# Patient Record
Sex: FEMALE | Race: OTHER | HISPANIC OR LATINO | URBAN - METROPOLITAN AREA
[De-identification: names, ages, dates, MRNs, and addresses within clinical notes are randomized per-mention and may not be internally consistent; named-entity substitution may affect disease eponyms.]

---

## 2018-04-22 ENCOUNTER — EMERGENCY (EMERGENCY)
Facility: HOSPITAL | Age: 23
LOS: 1 days | Discharge: HOME | End: 2018-04-22
Attending: EMERGENCY MEDICINE
Payer: COMMERCIAL

## 2018-04-22 VITALS
TEMPERATURE: 98 F | SYSTOLIC BLOOD PRESSURE: 110 MMHG | DIASTOLIC BLOOD PRESSURE: 60 MMHG | RESPIRATION RATE: 16 BRPM | OXYGEN SATURATION: 100 % | HEART RATE: 73 BPM

## 2018-04-22 VITALS
TEMPERATURE: 99 F | OXYGEN SATURATION: 100 % | HEART RATE: 78 BPM | DIASTOLIC BLOOD PRESSURE: 68 MMHG | SYSTOLIC BLOOD PRESSURE: 110 MMHG | RESPIRATION RATE: 20 BRPM

## 2018-04-22 LAB
ALBUMIN SERPL ELPH-MCNC: 4.2 G/DL — SIGNIFICANT CHANGE UP (ref 3.3–5)
ALP SERPL-CCNC: 56 U/L — SIGNIFICANT CHANGE UP (ref 40–120)
ALT FLD-CCNC: 13 U/L — SIGNIFICANT CHANGE UP (ref 10–45)
ANION GAP SERPL CALC-SCNC: 14 MMOL/L — SIGNIFICANT CHANGE UP (ref 5–17)
AST SERPL-CCNC: 13 U/L — SIGNIFICANT CHANGE UP (ref 10–40)
BASOPHILS # BLD AUTO: 0 K/UL — SIGNIFICANT CHANGE UP (ref 0–0.2)
BASOPHILS NFR BLD AUTO: 0.6 % — SIGNIFICANT CHANGE UP (ref 0–2)
BILIRUB SERPL-MCNC: 0.2 MG/DL — SIGNIFICANT CHANGE UP (ref 0.2–1.2)
BUN SERPL-MCNC: 9 MG/DL — SIGNIFICANT CHANGE UP (ref 7–23)
CALCIUM SERPL-MCNC: 9.5 MG/DL — SIGNIFICANT CHANGE UP (ref 8.4–10.5)
CHLORIDE SERPL-SCNC: 103 MMOL/L — SIGNIFICANT CHANGE UP (ref 96–108)
CO2 SERPL-SCNC: 26 MMOL/L — SIGNIFICANT CHANGE UP (ref 22–31)
CREAT SERPL-MCNC: 0.69 MG/DL — SIGNIFICANT CHANGE UP (ref 0.5–1.3)
EOSINOPHIL # BLD AUTO: 0 K/UL — SIGNIFICANT CHANGE UP (ref 0–0.5)
EOSINOPHIL NFR BLD AUTO: 0.5 % — SIGNIFICANT CHANGE UP (ref 0–6)
GLUCOSE SERPL-MCNC: 93 MG/DL — SIGNIFICANT CHANGE UP (ref 70–99)
HCT VFR BLD CALC: 34.4 % — LOW (ref 34.5–45)
HGB BLD-MCNC: 11.8 G/DL — SIGNIFICANT CHANGE UP (ref 11.5–15.5)
LYMPHOCYTES # BLD AUTO: 3.3 K/UL — SIGNIFICANT CHANGE UP (ref 1–3.3)
LYMPHOCYTES # BLD AUTO: 41.8 % — SIGNIFICANT CHANGE UP (ref 13–44)
MCHC RBC-ENTMCNC: 31.5 PG — SIGNIFICANT CHANGE UP (ref 27–34)
MCHC RBC-ENTMCNC: 34.2 GM/DL — SIGNIFICANT CHANGE UP (ref 32–36)
MCV RBC AUTO: 92.2 FL — SIGNIFICANT CHANGE UP (ref 80–100)
MONOCYTES # BLD AUTO: 0.7 K/UL — SIGNIFICANT CHANGE UP (ref 0–0.9)
MONOCYTES NFR BLD AUTO: 8.3 % — SIGNIFICANT CHANGE UP (ref 2–14)
NEUTROPHILS # BLD AUTO: 3.8 K/UL — SIGNIFICANT CHANGE UP (ref 1.8–7.4)
NEUTROPHILS NFR BLD AUTO: 48.8 % — SIGNIFICANT CHANGE UP (ref 43–77)
PLATELET # BLD AUTO: 250 K/UL — SIGNIFICANT CHANGE UP (ref 150–400)
POTASSIUM SERPL-MCNC: 4.2 MMOL/L — SIGNIFICANT CHANGE UP (ref 3.5–5.3)
POTASSIUM SERPL-SCNC: 4.2 MMOL/L — SIGNIFICANT CHANGE UP (ref 3.5–5.3)
PROT SERPL-MCNC: 7.2 G/DL — SIGNIFICANT CHANGE UP (ref 6–8.3)
RBC # BLD: 3.73 M/UL — LOW (ref 3.8–5.2)
RBC # FLD: 12.2 % — SIGNIFICANT CHANGE UP (ref 10.3–14.5)
SODIUM SERPL-SCNC: 143 MMOL/L — SIGNIFICANT CHANGE UP (ref 135–145)
WBC # BLD: 7.8 K/UL — SIGNIFICANT CHANGE UP (ref 3.8–10.5)
WBC # FLD AUTO: 7.8 K/UL — SIGNIFICANT CHANGE UP (ref 3.8–10.5)

## 2018-04-22 PROCEDURE — 99284 EMERGENCY DEPT VISIT MOD MDM: CPT | Mod: 25

## 2018-04-22 PROCEDURE — 99283 EMERGENCY DEPT VISIT LOW MDM: CPT

## 2018-04-22 PROCEDURE — 80053 COMPREHEN METABOLIC PANEL: CPT

## 2018-04-22 PROCEDURE — 85027 COMPLETE CBC AUTOMATED: CPT

## 2018-04-22 PROCEDURE — 82272 OCCULT BLD FECES 1-3 TESTS: CPT

## 2018-04-22 RX ORDER — LIDOCAINE 4 G/100G
5 CREAM TOPICAL ONCE
Qty: 0 | Refills: 0 | Status: COMPLETED | OUTPATIENT
Start: 2018-04-22 | End: 2018-04-22

## 2018-04-22 RX ORDER — LIDOCAINE 4 G/100G
1 CREAM TOPICAL
Qty: 1 | Refills: 0 | OUTPATIENT
Start: 2018-04-22

## 2018-04-22 RX ORDER — HYDROCORTISONE 1 %
1 OINTMENT (GRAM) TOPICAL
Qty: 1 | Refills: 0 | OUTPATIENT
Start: 2018-04-22

## 2018-04-22 RX ADMIN — LIDOCAINE 5 MILLILITER(S): 4 CREAM TOPICAL at 05:38

## 2018-04-22 NOTE — ED ADULT NURSE REASSESSMENT NOTE - NS ED NURSE REASSESS COMMENT FT1
Pt AAOx4, VSS, NAD, resting comfortably in bed. Pt verbalized understanding to return to ED for increased pain, increased rectal bleeding, or any feeling of lightheadedness. pt ambulated independently out of ED.

## 2018-04-22 NOTE — ED ADULT NURSE NOTE - OBJECTIVE STATEMENT
21 y/o female presents to the ED ambulatory with steady gait. A&Ox4. C/O rectal bleed for months. Pt. states was evaluated by gastroenterologist, performed colonoscopy, negative results. Pt. states normal formed bowel, last BM this morning. Pt. states blood present when she wipes, clots present. +easy work of breathing, clear lung sounds. abdomen soft, nontender, and nondistended. full range of motion of all extremities. no active bleeding present and no bumps present.

## 2018-04-22 NOTE — ED PROVIDER NOTE - MEDICAL DECISION MAKING DETAILS
Typical story for fissure, hemorrhoid but none seen on exam, guaiac negative. plan: pain control, cbc for anemia

## 2018-04-22 NOTE — ED ADULT NURSE REASSESSMENT NOTE - NS ED NURSE REASSESS COMMENT FT1
Pt. is resting in the room, easy work of breathing. VSS. Awaiting lab results. Safety maintained. Parents at the bedside. Will continue to monitor.

## 2018-04-22 NOTE — ED PROVIDER NOTE - PHYSICAL EXAMINATION
Gen: NAD, AOx3  Head: NCAT  HEENT: PERRL, oral mucosa moist, normal conjunctiva  Lung: CTAB, no respiratory distress  CV: rrr, no murmurs, Normal perfusion  Abd: soft, NTND, no CVA tenderness  Rectal: no external mass/hemorrhoid/tenderness/fissure, guaiac negative  MSK: No edema, no visible deformities  Neuro: No focal neurologic deficits  Skin: No rash   Psych: normal affect

## 2018-04-22 NOTE — ED PROVIDER NOTE - OBJECTIVE STATEMENT
Patient is 22 y F with PMH anxiety presenting with rectal pain with bleeding. Has been several week long episodes of having severe pain with defecation, bright red blood on toilet tissue with brown stool, for months, worse tonight, spoke with on call GI who recommended going to ED. Fam hx of IBS.  Is having GI workup, had colonoscopy/endoscopy last week wnl, capsule endoscopy pending. No weight loss. LMP: regular, 3/27    PMD: Darling Mathur  GI: Kai Krishnamurthy  ROS: Denies fever, palpitations, chills, recent sickness, HA, vision changes, cough, SOB, chest pain, n/v/d/c, dysuria, hematuria, rash, new joint aches, sick contacts, and recent travel. Patient is 22 y F with PMH anxiety presenting with rectal pain with bleeding. Has been several week long episodes of having severe pain with defecation, bright red blood on toilet tissue with brown stool, for months, worse tonight, spoke with on call GI who recommended going to ED. Fam hx of IBS.  Is having GI workup, had colonoscopy/endoscopy last week wnl, capsule endoscopy pending. No weight loss. LMP: regular, 3/27    PMD: Darling Mathur  GI: Kai Krishnamurthy  ROS: Denies fever, palpitations, chills, recent sickness, HA, vision changes, cough, SOB, chest pain, n/v/d/c, dysuria, hematuria, rash, new joint aches, sick contacts, and recent travel.    Attendinyo female presents with rectal pain for 2 months with bowel movements.  pain when she wipes.  sometimes has blood on stool.  pt seeing a GI doc for this.  Had a colonoscopy and endoscopy.  no fever.

## 2018-07-23 ENCOUNTER — APPOINTMENT (OUTPATIENT)
Dept: PULMONOLOGY | Facility: CLINIC | Age: 23
End: 2018-07-23

## 2018-07-27 ENCOUNTER — INPATIENT (INPATIENT)
Facility: HOSPITAL | Age: 23
LOS: 4 days | Discharge: HOME | End: 2018-08-01
Attending: PSYCHIATRY & NEUROLOGY | Admitting: PSYCHIATRY & NEUROLOGY
Payer: COMMERCIAL

## 2018-07-27 VITALS — WEIGHT: 158.95 LBS | HEIGHT: 63 IN | RESPIRATION RATE: 18 BRPM | TEMPERATURE: 99 F

## 2018-07-27 DIAGNOSIS — F33.2 MAJOR DEPRESSIVE DISORDER, RECURRENT SEVERE WITHOUT PSYCHOTIC FEATURES: ICD-10-CM

## 2018-07-27 PROCEDURE — 99222 1ST HOSP IP/OBS MODERATE 55: CPT

## 2018-07-27 RX ORDER — ACETAMINOPHEN 500 MG
650 TABLET ORAL EVERY 6 HOURS
Qty: 0 | Refills: 0 | Status: DISCONTINUED | OUTPATIENT
Start: 2018-07-27 | End: 2018-08-01

## 2018-07-27 RX ORDER — LANOLIN ALCOHOL/MO/W.PET/CERES
3 CREAM (GRAM) TOPICAL AT BEDTIME
Qty: 0 | Refills: 0 | Status: DISCONTINUED | OUTPATIENT
Start: 2018-07-27 | End: 2018-08-01

## 2018-07-27 RX ADMIN — Medication 3 MILLIGRAM(S): at 23:08

## 2018-07-28 PROCEDURE — 99232 SBSQ HOSP IP/OBS MODERATE 35: CPT

## 2018-07-28 PROCEDURE — 99222 1ST HOSP IP/OBS MODERATE 55: CPT

## 2018-07-28 RX ORDER — ONDANSETRON 8 MG/1
4 TABLET, FILM COATED ORAL ONCE
Qty: 0 | Refills: 0 | Status: COMPLETED | OUTPATIENT
Start: 2018-07-28 | End: 2018-07-28

## 2018-07-28 RX ADMIN — ONDANSETRON 4 MILLIGRAM(S): 8 TABLET, FILM COATED ORAL at 22:57

## 2018-07-28 RX ADMIN — Medication 650 MILLIGRAM(S): at 16:28

## 2018-07-28 RX ADMIN — Medication 650 MILLIGRAM(S): at 16:58

## 2018-07-28 NOTE — CONSULT NOTE ADULT - ASSESSMENT
24 yo F hx of MACKENZIE/depression admitted to [facility] after SA via drug OD.     Health Maintenance  -Pt asked about STD screening. She reports obtaining it last year w/ results wnl. She declines repeat testing at this time.     MACKENZIE/depression/ SA  - Management per primary psychiatry team

## 2018-07-28 NOTE — CONSULT NOTE ADULT - SUBJECTIVE AND OBJECTIVE BOX
HPI:   22 yo F hx of MACKENZIE/depression admitted to [facility] after SA via drug OD. Pt presented to [facility] hospital after ODing on Pristiq 1000mg total. She was monitored then transferred to [facility] for further management. Documents from [facility] reviewed. Pt w/ unremarkable EKG ( sinus, qtc < 400), CBC, CMP, troponin and toxicology screen.     Today pt reports feeling comfortable and has no complaints.       PAST MEDICAL & SURGICAL HISTORY:  No pertinent past medical history  No significant past surgical history      Review of Systems:   CONSTITUTIONAL: No fever, weight loss, or fatigue  EYES: No eye pain, or discharge  ENMT:  No difficulty hearing, tinnitus, vertigo; No sinus or throat pain  NECK: No pain or stiffness  RESPIRATORY: No cough, wheezing, chills or hemoptysis; No shortness of breath  CARDIOVASCULAR: No chest pain, palpitations, dizziness, or leg swelling  GASTROINTESTINAL: No abdominal or epigastric pain. No nausea, vomiting, or hematemesis; No diarrhea or constipation. No melena or hematochezia.  GENITOURINARY: No dysuria, frequency, hematuria, or incontinence, No vaginal discharge   NEUROLOGICAL: No headaches, memory loss, loss of strength, numbness, or tremors  SKIN: No itching, burning, rashes, or lesions   LYMPH NODES: No enlarged glands  ENDOCRINE: No heat or cold intolerance; No hair loss  MUSCULOSKELETAL: No joint pain or swelling; No muscle, back, or extremity pain  HEME/LYMPH: No easy bruising, or bleeding gums  ALLERY AND IMMUNOLOGIC: No known allergies to meds     Allergies    No Known Allergies    Intolerances        Social History:     FAMILY HISTORY: Lives at home with her step father and mother. Denies tobacco, ETOH or use of illicit drugs. She is sexually active      MEDICATIONS  (STANDING):    MEDICATIONS  (PRN):  acetaminophen   Tablet. 650 milliGRAM(s) Oral every 6 hours PRN Mild Pain (1 - 3)  LORazepam     Tablet 1 milliGRAM(s) Oral every 4 hours PRN Anxiety  LORazepam   Injectable 2 milliGRAM(s) IntraMuscular once PRN Agitation  melatonin. 3 milliGRAM(s) Oral at bedtime PRN Insomnia        CAPILLARY BLOOD GLUCOSE      PHYSICAL EXAM:  Vital Signs Last 24 Hrs  T(C): 36.9 (28 Jul 2018 08:03), Max: 37.1 (27 Jul 2018 21:56)  T(F): 98.4 (28 Jul 2018 08:03), Max: 98.8 (27 Jul 2018 21:56)  HR: 70 (28 Jul 2018 08:03) (70 - 70)  BP: 101/73 (28 Jul 2018 08:03) (101/73 - 101/73)  BP(mean): --  RR: 18 (27 Jul 2018 21:56) (18 - 18)  SpO2: --  GENERAL: NAD, well-developed  HEAD:  Atraumatic, Normocephalic  EYES: EOMI, PERRLA, conjunctiva and sclera clear  NECK: Supple, No JVD  CHEST/LUNG: Clear to auscultation bilaterally; No wheeze  HEART: Regular rate and rhythm; No murmurs, rubs, or gallops  ABDOMEN: Soft, Nontender, Nondistended; Bowel sounds present  EXTREMITIES:  2+ Peripheral Pulses, No clubbing, cyanosis, or edema  PSYCH: AAOx3  NEUROLOGY: non-focal  SKIN: No rashes or lesions    LABS and EKG  from [facility] is in paper chart and was personally reviewed

## 2018-07-29 PROCEDURE — 99231 SBSQ HOSP IP/OBS SF/LOW 25: CPT

## 2018-07-29 RX ORDER — ONDANSETRON 8 MG/1
4 TABLET, FILM COATED ORAL ONCE
Qty: 0 | Refills: 0 | Status: COMPLETED | OUTPATIENT
Start: 2018-07-29 | End: 2018-07-29

## 2018-07-29 RX ADMIN — ONDANSETRON 4 MILLIGRAM(S): 8 TABLET, FILM COATED ORAL at 13:40

## 2018-07-29 RX ADMIN — Medication 650 MILLIGRAM(S): at 12:41

## 2018-07-29 RX ADMIN — Medication 650 MILLIGRAM(S): at 18:11

## 2018-07-29 RX ADMIN — Medication 3 MILLIGRAM(S): at 01:31

## 2018-07-29 RX ADMIN — ONDANSETRON 4 MILLIGRAM(S): 8 TABLET, FILM COATED ORAL at 22:45

## 2018-07-29 RX ADMIN — Medication 650 MILLIGRAM(S): at 12:11

## 2018-07-29 RX ADMIN — Medication 650 MILLIGRAM(S): at 21:11

## 2018-07-30 PROCEDURE — 90832 PSYTX W PT 30 MINUTES: CPT

## 2018-07-30 PROCEDURE — 99232 SBSQ HOSP IP/OBS MODERATE 35: CPT

## 2018-07-30 RX ORDER — NORGESTIMATE AND ETHINYL ESTRADIOL 7DAYSX3 LO
1 KIT ORAL AT BEDTIME
Qty: 0 | Refills: 0 | Status: DISCONTINUED | OUTPATIENT
Start: 2018-07-30 | End: 2018-08-01

## 2018-07-30 RX ORDER — ONDANSETRON 8 MG/1
4 TABLET, FILM COATED ORAL EVERY 6 HOURS
Qty: 0 | Refills: 0 | Status: DISCONTINUED | OUTPATIENT
Start: 2018-07-30 | End: 2018-08-01

## 2018-07-30 RX ADMIN — Medication 650 MILLIGRAM(S): at 14:34

## 2018-07-30 RX ADMIN — NORGESTIMATE AND ETHINYL ESTRADIOL 1 TABLET(S): KIT at 20:16

## 2018-07-30 RX ADMIN — Medication 650 MILLIGRAM(S): at 20:16

## 2018-07-30 RX ADMIN — ONDANSETRON 4 MILLIGRAM(S): 8 TABLET, FILM COATED ORAL at 22:09

## 2018-07-30 RX ADMIN — Medication 650 MILLIGRAM(S): at 20:56

## 2018-07-30 RX ADMIN — Medication 650 MILLIGRAM(S): at 08:05

## 2018-07-30 RX ADMIN — ONDANSETRON 4 MILLIGRAM(S): 8 TABLET, FILM COATED ORAL at 15:16

## 2018-07-31 LAB
HCG UR-SCNC: NEGATIVE — SIGNIFICANT CHANGE UP
SP GR UR: 1 — LOW (ref 1–1.03)

## 2018-07-31 PROCEDURE — 99232 SBSQ HOSP IP/OBS MODERATE 35: CPT | Mod: 25

## 2018-07-31 PROCEDURE — 99233 SBSQ HOSP IP/OBS HIGH 50: CPT

## 2018-07-31 PROCEDURE — 90853 GROUP PSYCHOTHERAPY: CPT

## 2018-07-31 RX ORDER — NORGESTIMATE AND ETHINYL ESTRADIOL 7DAYSX3 LO
1 KIT ORAL
Qty: 0 | Refills: 0 | COMMUNITY
Start: 2018-07-31

## 2018-07-31 RX ADMIN — Medication 650 MILLIGRAM(S): at 17:10

## 2018-07-31 RX ADMIN — NORGESTIMATE AND ETHINYL ESTRADIOL 1 TABLET(S): KIT at 20:53

## 2018-07-31 RX ADMIN — Medication 650 MILLIGRAM(S): at 17:40

## 2018-07-31 RX ADMIN — ONDANSETRON 4 MILLIGRAM(S): 8 TABLET, FILM COATED ORAL at 17:10

## 2018-07-31 NOTE — PROGRESS NOTE ADULT - ASSESSMENT
22 yo F hx of MACKENZIE/depression admitted to [facility] after SA via drug OD with complaints of lower quadrant abdominal pain.     # Lower quadrant abdominal pain in female  - abdominal exam benign, no peritoneal signs, patient non-toxic appearing, afebrile, tolerating diet, ambulating independently, can c/w symptomatic management for now (could consider use of nsaids in addition to tylenol)  - denies diarrhea/constipation/dysuria/vaginal discharge  - lower suspicion for IBD, diverticulitis, appendicitis etc at this time  - would confirm negative pregnancy test from OSH, r/o ectopic pregnancy  - recent menstrual period, ?related to menstrual pain  - if persistent or severe pain, consider U/S to r/o ovarian torsion vs other abdominal/pelvic pathology   - check cbc/cmp  - can check UA r/o cystitis, patient also states amenable to STD testing    # MACKENZIE/depression/SA via drug OD  - safety precautions and management as per psych

## 2018-07-31 NOTE — PROGRESS NOTE ADULT - SUBJECTIVE AND OBJECTIVE BOX
CC/Reason for consult: abdominal pain    INTERVAL HPI/OVERNIGHT EVENTS:  Called to evaluate patient for abdominal pain x 2 days. Patient reports b/l lower quadrant crampy pain lasting for ~ 1min at a time, ~ 3/episodes per day without associated diarrhea/consipation/fever/dysuria. Pinches herself to try to distract self from pain. No other exacerbating or alleviating factors, pain usually self resolves. Has been trying some tylenol prn, no significant improvement. Some intermittent nausea, but no issues with tolerating diet. Denies vaginal discharge, had recent negative pregnancy test, reports just finishing up menstrual period. Denies history of STDs but is amenable to testing. Reports sexually active but monogamous with partner.    Otherwise, doing well, no other complaints.     MEDICATIONS  (STANDING):  ethinyl  estradiol-norgestimate 7/7/7-28 (ORTHO TRI-CYCLEN) 1 Tablet(s) Oral at bedtime    MEDICATIONS  (PRN):  acetaminophen   Tablet. 650 milliGRAM(s) Oral every 6 hours PRN Mild Pain (1 - 3)  LORazepam     Tablet 1 milliGRAM(s) Oral every 4 hours PRN Anxiety  LORazepam   Injectable 2 milliGRAM(s) IntraMuscular once PRN Agitation  melatonin. 3 milliGRAM(s) Oral at bedtime PRN Insomnia  ondansetron    Tablet 4 milliGRAM(s) Oral every 6 hours PRN Nausea and/or Vomiting    Allergies  No Known Allergies    Intolerances    REVIEW OF SYSTEMS:  Please see interval HPI:    Vital Signs Last 24 Hrs  T(C): 37 (31 Jul 2018 07:58), Max: 37 (31 Jul 2018 07:58)  T(F): 98.6 (31 Jul 2018 07:58), Max: 98.6 (31 Jul 2018 07:58)  HR: 100 (31 Jul 2018 07:58) (100 - 100)  BP: 90/62 (31 Jul 2018 07:58) (90/62 - 90/62)  BP(mean): --  RR: --  SpO2: --  I&O's Detail    PHYSICAL EXAM:  GENERAL: NAD, young adult female sitting in common area, able to ambulate independently   HEAD:  NC/AT  EYES: EOMI, clear sclera/conjunctiva  ENMT: MMM  NECK: supple  NERVOUS SYSTEM: AOx3, non-focal   CHEST/LUNG: No respiratory distress, comfortable on RA  ABDOMEN: soft, non-tender to palpation, no rebound or guarding, no obvious hernias  EXTREMITIES: no c/c/e  SKIN: no obvious lesions    LABS:    CAPILLARY BLOOD GLUCOSE    BLOOD CULTURE    RADIOLOGY & ADDITIONAL TESTS:    Imaging Personally Reviewed:  [ ] YES     Consultant(s) Notes Reviewed:      Care Discussed with Consultants/Other Providers: Dr. Monsalve Psych

## 2018-08-01 VITALS — DIASTOLIC BLOOD PRESSURE: 63 MMHG | TEMPERATURE: 98 F | HEART RATE: 100 BPM | SYSTOLIC BLOOD PRESSURE: 101 MMHG

## 2018-08-01 LAB
ALBUMIN SERPL ELPH-MCNC: 4.4 G/DL — SIGNIFICANT CHANGE UP (ref 3.3–5)
ALP SERPL-CCNC: 59 U/L — SIGNIFICANT CHANGE UP (ref 40–120)
ALT FLD-CCNC: 12 U/L — SIGNIFICANT CHANGE UP (ref 4–33)
AST SERPL-CCNC: 17 U/L — SIGNIFICANT CHANGE UP (ref 4–32)
BILIRUB SERPL-MCNC: 0.3 MG/DL — SIGNIFICANT CHANGE UP (ref 0.2–1.2)
BUN SERPL-MCNC: 14 MG/DL — SIGNIFICANT CHANGE UP (ref 7–23)
CALCIUM SERPL-MCNC: 9.4 MG/DL — SIGNIFICANT CHANGE UP (ref 8.4–10.5)
CHLORIDE SERPL-SCNC: 99 MMOL/L — SIGNIFICANT CHANGE UP (ref 98–107)
CO2 SERPL-SCNC: 25 MMOL/L — SIGNIFICANT CHANGE UP (ref 22–31)
CREAT SERPL-MCNC: 0.73 MG/DL — SIGNIFICANT CHANGE UP (ref 0.5–1.3)
GLUCOSE SERPL-MCNC: 86 MG/DL — SIGNIFICANT CHANGE UP (ref 70–99)
HCT VFR BLD CALC: 36.5 % — SIGNIFICANT CHANGE UP (ref 34.5–45)
HGB BLD-MCNC: 11.8 G/DL — SIGNIFICANT CHANGE UP (ref 11.5–15.5)
HIV 1+2 AB+HIV1 P24 AG SERPL QL IA: SIGNIFICANT CHANGE UP
MCHC RBC-ENTMCNC: 29.2 PG — SIGNIFICANT CHANGE UP (ref 27–34)
MCHC RBC-ENTMCNC: 32.3 % — SIGNIFICANT CHANGE UP (ref 32–36)
MCV RBC AUTO: 90.3 FL — SIGNIFICANT CHANGE UP (ref 80–100)
NRBC # FLD: 0 — SIGNIFICANT CHANGE UP
PLATELET # BLD AUTO: 351 K/UL — SIGNIFICANT CHANGE UP (ref 150–400)
PMV BLD: 10.9 FL — SIGNIFICANT CHANGE UP (ref 7–13)
POTASSIUM SERPL-MCNC: 4.3 MMOL/L — SIGNIFICANT CHANGE UP (ref 3.5–5.3)
POTASSIUM SERPL-SCNC: 4.3 MMOL/L — SIGNIFICANT CHANGE UP (ref 3.5–5.3)
PROT SERPL-MCNC: 7.8 G/DL — SIGNIFICANT CHANGE UP (ref 6–8.3)
RBC # BLD: 4.04 M/UL — SIGNIFICANT CHANGE UP (ref 3.8–5.2)
RBC # FLD: 13.9 % — SIGNIFICANT CHANGE UP (ref 10.3–14.5)
SODIUM SERPL-SCNC: 137 MMOL/L — SIGNIFICANT CHANGE UP (ref 135–145)
WBC # BLD: 5.74 K/UL — SIGNIFICANT CHANGE UP (ref 3.8–10.5)
WBC # FLD AUTO: 5.74 K/UL — SIGNIFICANT CHANGE UP (ref 3.8–10.5)

## 2018-08-01 PROCEDURE — 99238 HOSP IP/OBS DSCHRG MGMT 30/<: CPT | Mod: 25

## 2018-08-01 PROCEDURE — 90853 GROUP PSYCHOTHERAPY: CPT

## 2018-08-01 RX ORDER — HYDROCORTISONE 1 %
1 OINTMENT (GRAM) TOPICAL
Qty: 0 | Refills: 0 | Status: DISCONTINUED | OUTPATIENT
Start: 2018-08-01 | End: 2018-08-01

## 2018-08-06 ENCOUNTER — OUTPATIENT (OUTPATIENT)
Dept: OUTPATIENT SERVICES | Facility: HOSPITAL | Age: 23
LOS: 1 days | Discharge: HOME | End: 2018-08-06

## 2018-09-25 DIAGNOSIS — F31.9 BIPOLAR DISORDER, UNSPECIFIED: ICD-10-CM

## 2018-11-14 ENCOUNTER — TRANSCRIPTION ENCOUNTER (OUTPATIENT)
Age: 23
End: 2018-11-14

## 2019-01-28 ENCOUNTER — TRANSCRIPTION ENCOUNTER (OUTPATIENT)
Age: 24
End: 2019-01-28

## 2019-03-20 ENCOUNTER — TRANSCRIPTION ENCOUNTER (OUTPATIENT)
Age: 24
End: 2019-03-20

## 2019-04-05 ENCOUNTER — EMERGENCY (EMERGENCY)
Facility: HOSPITAL | Age: 24
LOS: 1 days | Discharge: HOME | End: 2019-04-05
Attending: EMERGENCY MEDICINE
Payer: COMMERCIAL

## 2019-04-05 VITALS
OXYGEN SATURATION: 99 % | TEMPERATURE: 98 F | SYSTOLIC BLOOD PRESSURE: 104 MMHG | HEIGHT: 63 IN | HEART RATE: 81 BPM | RESPIRATION RATE: 16 BRPM | DIASTOLIC BLOOD PRESSURE: 68 MMHG | WEIGHT: 145.06 LBS

## 2019-04-05 VITALS
TEMPERATURE: 99 F | SYSTOLIC BLOOD PRESSURE: 119 MMHG | OXYGEN SATURATION: 100 % | RESPIRATION RATE: 18 BRPM | HEART RATE: 80 BPM | DIASTOLIC BLOOD PRESSURE: 74 MMHG

## 2019-04-05 PROCEDURE — 73562 X-RAY EXAM OF KNEE 3: CPT | Mod: 26,LT

## 2019-04-05 PROCEDURE — 99283 EMERGENCY DEPT VISIT LOW MDM: CPT

## 2019-04-05 PROCEDURE — 73562 X-RAY EXAM OF KNEE 3: CPT

## 2019-04-05 RX ORDER — IBUPROFEN 200 MG
600 TABLET ORAL ONCE
Qty: 0 | Refills: 0 | Status: COMPLETED | OUTPATIENT
Start: 2019-04-05 | End: 2019-04-05

## 2019-04-05 RX ADMIN — Medication 600 MILLIGRAM(S): at 13:03

## 2019-04-05 NOTE — ED PROVIDER NOTE - OBJECTIVE STATEMENT
Dr. Sotelo (Attending Physician)  Pt. was rock climbing one week ago and afterwards noticed left knee pain worsened yesterday now with difficulty ambulating.  Denies numbness, tingling, weakness, ankle or hip pain.

## 2019-04-05 NOTE — ED PROVIDER NOTE - NSFOLLOWUPINSTRUCTIONS_ED_ALL_ED_FT
Follow-up with ortho for you likely MCL Sprain. Avoid rockclimbing or other strenuous activity until seen by ortho. Take ibuprofen 600 mg (3 tabs) every 8 hours with food as needed for pain

## 2019-04-05 NOTE — ED ADULT NURSE NOTE - OBJECTIVE STATEMENT
23 year old female comes to the ED c/o left knee pain s/p rock climbing 1 week ago. Patient has no PMH. Patient states "I feel like I twisted my knee the wrong way when I was rock climbing." Patient is a/ox3, VSS and in NAD. Patient rates the pain a 7/10 on the pain scale and aching in quality. Patient denies taking any medication for the pain. Swelling is noted to the left knee. Patient states she has problems ambulating, but ambulates with pain. Patient denies numbness/tingling/weakness, CP/SOB, fever/chills, nausea/vomitting/diarrhea. Patient awaiting to be seen and evaluated by MD.

## 2019-04-05 NOTE — ED PROVIDER NOTE - CLINICAL SUMMARY MEDICAL DECISION MAKING FREE TEXT BOX
Dr. Sotelo (Attending Physician)  Left medial collateral ligament ttp will xray since unable to ambulate without assistance.  Ibuprofen given. Will give knee brace and Follow up with ortho.

## 2019-04-05 NOTE — ED PROVIDER NOTE - NSFOLLOWUPCLINICS_GEN_ALL_ED_FT
[facility] [place]  [facility]  .  [place]   Phone: [phone]  Fax:   Follow Up Time:

## 2019-04-17 PROBLEM — F41.9 ANXIETY DISORDER, UNSPECIFIED: Chronic | Status: ACTIVE | Noted: 2019-04-05

## 2019-04-17 PROBLEM — F32.9 MAJOR DEPRESSIVE DISORDER, SINGLE EPISODE, UNSPECIFIED: Chronic | Status: ACTIVE | Noted: 2019-04-05

## 2019-04-25 ENCOUNTER — OUTPATIENT (OUTPATIENT)
Dept: OUTPATIENT SERVICES | Facility: HOSPITAL | Age: 24
LOS: 1 days | End: 2019-04-25
Payer: COMMERCIAL

## 2019-04-25 VITALS
TEMPERATURE: 98 F | OXYGEN SATURATION: 99 % | WEIGHT: 145.95 LBS | SYSTOLIC BLOOD PRESSURE: 111 MMHG | RESPIRATION RATE: 16 BRPM | DIASTOLIC BLOOD PRESSURE: 64 MMHG | HEART RATE: 68 BPM

## 2019-04-25 DIAGNOSIS — Z01.818 ENCOUNTER FOR OTHER PREPROCEDURAL EXAMINATION: ICD-10-CM

## 2019-04-25 DIAGNOSIS — S83.232A COMPLEX TEAR OF MEDIAL MENISCUS, CURRENT INJURY, LEFT KNEE, INITIAL ENCOUNTER: ICD-10-CM

## 2019-04-25 DIAGNOSIS — R56.9 UNSPECIFIED CONVULSIONS: ICD-10-CM

## 2019-04-25 LAB
HCG SERPL-ACNC: <1 MIU/ML — SIGNIFICANT CHANGE UP
HCT VFR BLD CALC: 38.2 % — SIGNIFICANT CHANGE UP (ref 34.5–45)
HGB BLD-MCNC: 12.6 G/DL — SIGNIFICANT CHANGE UP (ref 11.5–15.5)
MCHC RBC-ENTMCNC: 30.1 PG — SIGNIFICANT CHANGE UP (ref 27–34)
MCHC RBC-ENTMCNC: 33 GM/DL — SIGNIFICANT CHANGE UP (ref 32–36)
MCV RBC AUTO: 91.2 FL — SIGNIFICANT CHANGE UP (ref 80–100)
NRBC # BLD: 0 /100 WBCS — SIGNIFICANT CHANGE UP (ref 0–0)
PLATELET # BLD AUTO: 333 K/UL — SIGNIFICANT CHANGE UP (ref 150–400)
RBC # BLD: 4.19 M/UL — SIGNIFICANT CHANGE UP (ref 3.8–5.2)
RBC # FLD: 13.4 % — SIGNIFICANT CHANGE UP (ref 10.3–14.5)
WBC # BLD: 5.32 K/UL — SIGNIFICANT CHANGE UP (ref 3.8–10.5)
WBC # FLD AUTO: 5.32 K/UL — SIGNIFICANT CHANGE UP (ref 3.8–10.5)

## 2019-04-25 PROCEDURE — 36415 COLL VENOUS BLD VENIPUNCTURE: CPT

## 2019-04-25 PROCEDURE — G0463: CPT

## 2019-04-25 PROCEDURE — 85027 COMPLETE CBC AUTOMATED: CPT

## 2019-04-25 PROCEDURE — 84702 CHORIONIC GONADOTROPIN TEST: CPT

## 2019-04-25 RX ORDER — ESCITALOPRAM OXALATE 10 MG/1
0 TABLET, FILM COATED ORAL
Qty: 0 | Refills: 0 | COMMUNITY

## 2019-04-25 NOTE — H&P PST ADULT - HISTORY OF PRESENT ILLNESS
Pt twisted left knee while rock climbing and started to have pain. Pt rates left knee pain to be 7/10 and pt states pain is worse when ambulating. Pt denies left knee swelling but with limping gait. Pt electing for left knee arthroscopy, meniscectomy, cho 24 yo female with PMH of anxiety and depression here for PST. Pt twisted left knee while rock climbing about 3 weeks ago and started to have left knee pain. Pt rates left knee pain to be 7/10 and pt states pain is worse when ambulating but not taking po analgesia. Pt denies left knee swelling but with limping gait. Pt s/p MRI of left knee - "there is a tear of meniscus". Pt electing for left knee arthroscopy, meniscectomy, chondroplasty on 5/2/19.

## 2019-04-25 NOTE — H&P PST ADULT - ASSESSMENT
24 yo female with complex tear of medial meniscus, current injury, initial encounter 22 yo female with complex tear of medial meniscus, current injury, initial encounter.

## 2019-04-25 NOTE — H&P PST ADULT - NSANTHOSAYNRD_GEN_A_CORE
No. ALTAGRACIA screening performed.  STOP BANG Legend: 0-2 = LOW Risk; 3-4 = INTERMEDIATE Risk; 5-8 = HIGH Risk

## 2019-04-25 NOTE — H&P PST ADULT - PSYCHIATRIC COMMENTS
Pt reports to occasional panic attacks, last about 4 weeks ago. Pt sees a therapist weekly. Pt reports to occasional panic attacks, last episode about 4 weeks ago. Pt sees a therapist weekly.

## 2019-04-25 NOTE — H&P PST ADULT - HAS THE PATIENT HAD A SIGNIFICANT CHANGE IN FUNCTIONAL STATUS DUE TO CVA, HEAD TRAUMA, ORTHOPEDIC TRAUMA/SURGERY, OR FALL, WITH THE WEEK PRIOR TO ADMISSION
Group Topic: Process Group    Start Time: 1:00 PM  End Time: 2:00 PM    Focus: Homework review/Check-in  Number in attendance: 5      Attendance: Present  Response Communication: Appropriate topic  MoodAffect: Appropriate to group  Behavior/Socialization: Appropriate to group  Thought Process: Appropriate  Patient Response: Appropriate feedback, Attentive, Interactive and Nonverbal feedback    Pt reported that he completed his intended goals. Pt disclosed that he had a difficult night on Friday, saying that he had a \"mini-anxiety attack,\" but he said that he was able to distract and \"stabilize\" his feelings. Pt discussed continued issue with sleep but acknowledged that he's has better able to function the following day and has made significant progress in his ability to radically accept his (lack of) sleep.      no

## 2019-04-25 NOTE — H&P PST ADULT - ANESTHESIA, PREVIOUS REACTION, PROFILE
unknown/Pt denies family history of problems with Pt denies family history of problems with anesthesia/unknown

## 2019-04-25 NOTE — H&P PST ADULT - NSICDXPROBLEM_GEN_ALL_CORE_FT
PROBLEM DIAGNOSES  Problem: Preoperative testing  Assessment and Plan: No medical clearance needed as per surgeon. CBC and HCG ordered. Pre-op instructions and surgical scrubs given and pt verbalized understanding.      Problem: Seizure  Assessment and Plan: Called for last neurology office visit note and received in chart.     Problem: Complex tear of medial meniscus, current injury, left knee, initial encounter  Assessment and Plan: Left knee arthroscopy, meniscectomy, chondroplasty on 5/2/19.

## 2019-04-25 NOTE — H&P PST ADULT - NSICDXPASTMEDICALHX_GEN_ALL_CORE_FT
PAST MEDICAL HISTORY:  Anxiety     Complex tear of medial meniscus, current injury, left knee, initial encounter     Depression PAST MEDICAL HISTORY:  Anxiety     Complex tear of medial meniscus, current injury, left knee, initial encounter     Depression     Seizure (H/x of petit mal seizure dx at age 8, last seizure at age 13, no meds since age 13, follows up with neurologist annually, last seeen in 2018)

## 2019-04-26 PROBLEM — R56.9 UNSPECIFIED CONVULSIONS: Chronic | Status: ACTIVE | Noted: 2019-04-25

## 2019-05-01 ENCOUNTER — TRANSCRIPTION ENCOUNTER (OUTPATIENT)
Age: 24
End: 2019-05-01

## 2019-05-02 ENCOUNTER — RESULT REVIEW (OUTPATIENT)
Age: 24
End: 2019-05-02

## 2019-05-02 ENCOUNTER — OUTPATIENT (OUTPATIENT)
Dept: OUTPATIENT SERVICES | Facility: HOSPITAL | Age: 24
LOS: 1 days | End: 2019-05-02
Payer: COMMERCIAL

## 2019-05-02 VITALS
RESPIRATION RATE: 12 BRPM | DIASTOLIC BLOOD PRESSURE: 52 MMHG | OXYGEN SATURATION: 99 % | TEMPERATURE: 98 F | HEART RATE: 70 BPM | SYSTOLIC BLOOD PRESSURE: 91 MMHG

## 2019-05-02 VITALS
SYSTOLIC BLOOD PRESSURE: 104 MMHG | RESPIRATION RATE: 18 BRPM | WEIGHT: 145.06 LBS | HEIGHT: 63.25 IN | DIASTOLIC BLOOD PRESSURE: 56 MMHG | TEMPERATURE: 98 F | HEART RATE: 80 BPM | OXYGEN SATURATION: 99 %

## 2019-05-02 DIAGNOSIS — S83.232A COMPLEX TEAR OF MEDIAL MENISCUS, CURRENT INJURY, LEFT KNEE, INITIAL ENCOUNTER: ICD-10-CM

## 2019-05-02 PROCEDURE — 88304 TISSUE EXAM BY PATHOLOGIST: CPT | Mod: 26

## 2019-05-02 PROCEDURE — 29881 ARTHRS KNE SRG MNISECTMY M/L: CPT | Mod: LT

## 2019-05-02 RX ORDER — ACETAMINOPHEN 500 MG
650 TABLET ORAL ONCE
Qty: 0 | Refills: 0 | Status: COMPLETED | OUTPATIENT
Start: 2019-05-02 | End: 2019-05-02

## 2019-05-02 RX ORDER — CEFAZOLIN SODIUM 1 G
2000 VIAL (EA) INJECTION ONCE
Qty: 0 | Refills: 0 | Status: COMPLETED | OUTPATIENT
Start: 2019-05-02 | End: 2019-05-02

## 2019-05-02 RX ORDER — ESCITALOPRAM OXALATE 10 MG/1
1 TABLET, FILM COATED ORAL
Qty: 0 | Refills: 0 | COMMUNITY

## 2019-05-02 RX ORDER — SODIUM CHLORIDE 9 MG/ML
1000 INJECTION, SOLUTION INTRAVENOUS
Qty: 0 | Refills: 0 | Status: DISCONTINUED | OUTPATIENT
Start: 2019-05-02 | End: 2019-05-02

## 2019-05-02 RX ORDER — ONDANSETRON 8 MG/1
4 TABLET, FILM COATED ORAL ONCE
Qty: 0 | Refills: 0 | Status: COMPLETED | OUTPATIENT
Start: 2019-05-02 | End: 2019-05-02

## 2019-05-02 RX ORDER — HYDROMORPHONE HYDROCHLORIDE 2 MG/ML
0.5 INJECTION INTRAMUSCULAR; INTRAVENOUS; SUBCUTANEOUS
Qty: 0 | Refills: 0 | Status: DISCONTINUED | OUTPATIENT
Start: 2019-05-02 | End: 2019-05-02

## 2019-05-02 RX ORDER — OXYCODONE AND ACETAMINOPHEN 5; 325 MG/1; MG/1
1 TABLET ORAL ONCE
Qty: 0 | Refills: 0 | Status: DISCONTINUED | OUTPATIENT
Start: 2019-05-02 | End: 2019-05-02

## 2019-05-02 RX ADMIN — HYDROMORPHONE HYDROCHLORIDE 0.5 MILLIGRAM(S): 2 INJECTION INTRAMUSCULAR; INTRAVENOUS; SUBCUTANEOUS at 10:55

## 2019-05-02 RX ADMIN — HYDROMORPHONE HYDROCHLORIDE 0.5 MILLIGRAM(S): 2 INJECTION INTRAMUSCULAR; INTRAVENOUS; SUBCUTANEOUS at 12:17

## 2019-05-02 RX ADMIN — Medication 650 MILLIGRAM(S): at 08:01

## 2019-05-02 RX ADMIN — HYDROMORPHONE HYDROCHLORIDE 0.5 MILLIGRAM(S): 2 INJECTION INTRAMUSCULAR; INTRAVENOUS; SUBCUTANEOUS at 10:45

## 2019-05-02 RX ADMIN — ONDANSETRON 4 MILLIGRAM(S): 8 TABLET, FILM COATED ORAL at 11:43

## 2019-05-02 RX ADMIN — SODIUM CHLORIDE 60 MILLILITER(S): 9 INJECTION, SOLUTION INTRAVENOUS at 08:01

## 2019-05-02 RX ADMIN — HYDROMORPHONE HYDROCHLORIDE 0.5 MILLIGRAM(S): 2 INJECTION INTRAMUSCULAR; INTRAVENOUS; SUBCUTANEOUS at 11:12

## 2019-05-02 RX ADMIN — SODIUM CHLORIDE 75 MILLILITER(S): 9 INJECTION, SOLUTION INTRAVENOUS at 10:58

## 2019-05-02 NOTE — ASU PATIENT PROFILE, ADULT - PMH
Anxiety    Complex tear of medial meniscus, current injury, left knee, initial encounter    Depression    Seizure  (H/x of petit mal seizure dx at age 8, last seizure at age 13, no meds since age 13, follows up with neurologist annually, last seeen in 2018)

## 2019-05-02 NOTE — ASU DISCHARGE PLAN (ADULT/PEDIATRIC) - ASU DC SPECIAL INSTRUCTIONSFT
Knee Arthroscopy Instructions    1) Your knee will swell over the next 48hours and you can expect pain to get a bit worse. Ice your Knee plenty, continuously if possible. Fill up a plastic bag, then wrap it in a towel or pillow case.     2) Elevate your leg above your heart with about 3 pillows when you can, when you are in bed or chair. Otherwise you should be up and about, walking as much as you can tolerate. The more you move the better you will do. Weight bearing as tolerated.    3) BANDAGE: Expect some mild bloody drainage. It is normal. It may soak through the gauze and ACE bandage. This is mostly leftover Saline fluid coming out of your knee from surgery. Just place another Gauze and another ACE over it and wrap it snug but not overly tight. If it bleeds through the second bandage again, call.    4) SHOWER: Remove bandage in 48hrs and place waterproof band aides. You can shower in 48 hours. No bath. Pat your incisions dry. No creams, no lotions.    5) Only reason to worry would be if pain got so severe that you cannot feel or wiggle your toes, or if your foot is cold. In this case you need to call or come to the ER. But as long as you can feel and wiggle your toes, you are fine.    6) Call the office to schedule a follow up appointment to be seen in 10-14 days. Your Sutures will be removed at that point.    7) A pain Rx is in the chart; fill it on your way home. OR was sent electronically to your pharmacy, pick it up on the way home.

## 2019-05-02 NOTE — ASU DISCHARGE PLAN (ADULT/PEDIATRIC) - CARE PROVIDER_API CALL
Karsten Monreal)  Orthopaedic Surgery Surgery  [address] [place] [address]  Phone: [phone]  Fax: [phone]  Follow Up Time:

## 2019-05-02 NOTE — ASU PATIENT PROFILE, ADULT - NSALCOHOLPROBLEMSRELYN_GEN_A_CORE_SD
Vision Screen:  R  20/25   L  20/25     Corrective Lenses:  No      Hearing Screen:  R  Passed   L  Passed      Performed by: ANNIE Amado    Mother will schedule dental appointment.  ---ANNIE Amado          
no

## 2019-05-02 NOTE — ASU DISCHARGE PLAN (ADULT/PEDIATRIC) - CALL YOUR DOCTOR IF YOU HAVE ANY OF THE FOLLOWING:
Swelling that gets worse/Fever greater than (need to indicate Fahrenheit or Celsius)/Bleeding that does not stop

## 2019-05-03 LAB — SURGICAL PATHOLOGY STUDY: SIGNIFICANT CHANGE UP

## 2019-11-05 ENCOUNTER — TRANSCRIPTION ENCOUNTER (OUTPATIENT)
Age: 24
End: 2019-11-05

## 2019-11-11 ENCOUNTER — TRANSCRIPTION ENCOUNTER (OUTPATIENT)
Age: 24
End: 2019-11-11

## 2019-11-25 ENCOUNTER — TRANSCRIPTION ENCOUNTER (OUTPATIENT)
Age: 24
End: 2019-11-25

## 2020-02-18 PROBLEM — S83.232A COMPLEX TEAR OF MEDIAL MENISCUS, CURRENT INJURY, LEFT KNEE, INITIAL ENCOUNTER: Chronic | Status: ACTIVE | Noted: 2019-04-25

## 2020-02-27 ENCOUNTER — APPOINTMENT (OUTPATIENT)
Dept: PULMONOLOGY | Facility: CLINIC | Age: 25
End: 2020-02-27
Payer: COMMERCIAL

## 2020-02-27 VITALS
SYSTOLIC BLOOD PRESSURE: 105 MMHG | RESPIRATION RATE: 15 BRPM | TEMPERATURE: 98.3 F | OXYGEN SATURATION: 95 % | HEART RATE: 66 BPM | DIASTOLIC BLOOD PRESSURE: 69 MMHG | WEIGHT: 163 LBS | HEIGHT: 63.5 IN | BODY MASS INDEX: 28.53 KG/M2

## 2020-02-27 DIAGNOSIS — F12.90 CANNABIS USE, UNSPECIFIED, UNCOMPLICATED: ICD-10-CM

## 2020-02-27 DIAGNOSIS — Z78.9 OTHER SPECIFIED HEALTH STATUS: ICD-10-CM

## 2020-02-27 DIAGNOSIS — G47.10 HYPERSOMNIA, UNSPECIFIED: ICD-10-CM

## 2020-02-27 DIAGNOSIS — G40.A09 ABSENCE EPILEPTIC SYNDROME, NOT INTRACTABLE, W/OUT STATUS EPILEPTICUS: ICD-10-CM

## 2020-02-27 DIAGNOSIS — Z82.5 FAMILY HISTORY OF ASTHMA AND OTHER CHRONIC LOWER RESPIRATORY DISEASES: ICD-10-CM

## 2020-02-27 DIAGNOSIS — Z97.5 PRESENCE OF (INTRAUTERINE) CONTRACEPTIVE DEVICE: ICD-10-CM

## 2020-02-27 PROCEDURE — 99205 OFFICE O/P NEW HI 60 MIN: CPT

## 2020-02-28 ENCOUNTER — TRANSCRIPTION ENCOUNTER (OUTPATIENT)
Age: 25
End: 2020-02-28

## 2020-02-28 PROBLEM — F12.90 USES MARIJUANA: Status: ACTIVE | Noted: 2020-02-28

## 2020-02-28 PROBLEM — Z78.9 DRINKS BEER: Status: ACTIVE | Noted: 2020-02-28

## 2020-02-28 PROBLEM — G40.A09 SEIZURE, PETIT MAL: Status: ACTIVE | Noted: 2020-02-28

## 2020-02-28 PROBLEM — Z82.5 FAMILY HISTORY OF ASTHMA: Status: ACTIVE | Noted: 2020-02-28

## 2020-02-28 PROBLEM — Z97.5 IUD (INTRAUTERINE DEVICE) IN PLACE: Status: ACTIVE | Noted: 2020-02-28

## 2020-02-28 NOTE — HISTORY OF PRESENT ILLNESS
[Frequent Nocturnal Awakening] : frequent nocturnal awakening [Daytime Somnolence] : daytime somnolence [ESS: ___] : ESS score [unfilled] [Awakes Unrefreshed] : awakening unrefreshed [Unintentional Sleep While Inactive] : unintentional sleep while inactive [DMS] : DMS [Recent  Weight Gain] : recent weight gain [Hypersomnolence] : hypersomnolence [To Bed: ___] : ~he/she~ goes to bed at [unfilled] [Arises: ___] : arises at [unfilled] [Sleep Onset Latency: ___ minutes] : sleep onset latency of [unfilled] minutes reported [Nocturnal Awakenings: ___] : ~he/she~ typically has [unfilled] nocturnal awakenings [WASO: ___] : Wake time after sleep onset is [unfilled] [Daytime Sleep: ___] : daytime sleep: [unfilled] [TST: ___] : Total sleep time is [unfilled] [Witnessed Apneas] : no witnessed sleep apnea [FreeTextEntry1] :  24 year old MARIE LORENZO presents for evaluation of fatigue and sleepiness. \par \par COMORBID:  Petit mal seizures [id number], anxiety, depression.\par \par CHIEF COMPLAINT:  The patient reports "I am always tired, sleep a lot and never feel rested - as long as I can remember", (15-years?) affecting daytime function.  Previously her symptoms were attributed to epilepsy, depression, and being a teenager.\par \par HYPERSOMNOLENCE:  Onset at  ~ age 9 or 10 years old.  No prolonged illness at onset.  Concussion age 19 without LOC. Unintentional sleep while reading and watching TV.  Drowsy driving without falling asleep.  Never feels refreshed in the morning regardless of amount of sleep.  No narcolepsy symptoms.  No stimulant history.  She reports a moderate degree of fatigue for ~ 15 years.  On Lexapro for ~ one year.\par \par SLEEP:  She typically sleeps 11 hours on average, between 12AM-2PM, with a rare 3-4 hour afternoon nap (not refreshing).  Ideal / preferred sleep schedule is 2AM-2PM.   \par \par SLEEP MAINTENANCE INSOMNIA:  for ~ 15-years. Multiple awakenings every 1-2 hours for unclear reasons (checks the time and resumes sleep).  Melatonin 10 mg for 1-month was ineffective.  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  Previously she was a full-time daytime student. \par \par SOCIAL HX:  smokes marijuana once monthly, smoked 1 cigarette daily for 1-month.  Consumes 2-beers once/twice weekly; one cup of coffee daily.\par \par FAMILY HX:  Maternal GM, GGM and Gaunt all experienced hypersomnolence. [Snoring] : no snoring [Unintentional Sleep while Active] : no unintentional sleep while active [Awakes with Headache] : no headache upon awakening [DIS] : no DIS [Awakening With Dry Mouth] : no dry mouth upon awakening [Unusual Sleep Behavior] : no unusual sleep behavior [Hypnagogic Hallucinations] : no hallucinations when falling asleep [Cataplexy] : no cataplexy [Sleep Paralysis] : no sleep paralysis [Hypnopompic Hallucinations] : no hallucinations when awakening [Lower Extremity Discomfort] : no lower extremity discomfort in evening or at bedtime

## 2020-02-28 NOTE — PHYSICAL EXAM
[Normal Appearance] : normal appearance [General Appearance - In No Acute Distress] : no acute distress [Well Groomed] : well groomed [Normal Conjunctiva] : the conjunctiva exhibited no abnormalities [IV] : IV [Neck Appearance] : the appearance of the neck was normal [Heart Rate And Rhythm] : heart rate was normal and rhythm regular [] : no respiratory distress [Murmurs] : no murmurs [Auscultation Breath Sounds / Voice Sounds] : lungs were clear to auscultation bilaterally [Involuntary Movements] : no involuntary movements were seen [Affect] : the affect was normal [No Focal Deficits] : no focal deficits [Mood] : the mood was normal [FreeTextEntry2] : no edema

## 2020-02-28 NOTE — REVIEW OF SYSTEMS
[Fatigue] : fatigue [Hypersomnolence] : sleeping much more than usual [Difficulty Maintaining Sleep] : difficulty maintaining sleep [Frequent Nocturnal Awakenings] : frequent nocturnal awakenings from sleep [Daytime Somnolence: ESS=____] : daytime somnolence: ESS=[unfilled] [Awakes Unrefreshed] : nonrestorative sleep [Unintentional Sleep while inactive] : unintentional sleep while inactive [Recent Wt Gain (___ Lbs)] : recent [unfilled] ~Ulb weight gain [Nasal Congestion] : no nasal congestion [Postnasal Drip] : no postnasal drip [Chest Pain] : no chest pain [Palpitations] : no palpitations [Shortness Of Breath] : no shortness of breath [Diabetes] : no diabetes  [Thyroid Disease] : no thyroid disease [Edema] : ~T edema was not present [Obesity] : not obese [Heartburn] : no heartburn [History of Iron Deficiency] : no history of iron deficiency [Anemia] : no anemia [Fibromyalgia] : no fibromyalgia [Arthralgias] : no arthralgias [Nocturia] : no nocturia [Anxious] : not anxious [Snoring] : no snoring [Depression] : no depression [Witnessed Apneas] : demonstrated no ~M apnea [Awakes With Headache] : awakes without a headache [Unintentional Sleep while active] : no unintentional sleep while active [Recent Wt Loss (___ Lbs)] : no recent weight loss [Difficulty Initiating Sleep] : no difficulty falling asleep [Awakes With Dry Mouth] : awakes without dry mouth [Hypnogogic Hallucinations] : no hypnogogic hallucinations [Lower Extremity Discomfort] : no lower extremity discomfort [Cataplexy] :  no cataplexy [Hypnopompic Hallucinations] : no hypnopompic hallucinations [Sleep Paralysis] : no sleep paralysis [de-identified] : on Lexapro 15 mg for ~ one year, and has a therapist [de-identified] : Last seizure 2009 [de-identified] : last blood test end 2018 [FreeTextEntry4] : every 1-2 hours for unclear reaons [FreeTextEntry1] : 12-2 AM [FreeTextEntry2] : 2 PM [FreeTextEntry3] : quickly [FreeTextEntry6] : 10-12 hours [FreeTextEntry5] : 1-1.5 hours [FreeTextEntry7] : 3 to 4-hour nap at 4/5PM ~ every 2 weeks

## 2020-02-28 NOTE — ASSESSMENT
[FreeTextEntry1] :  24 year old AMRIE LORENZO with anxiety, depression, past history of petit mal seizure, presents for evaluation of hypersomnolence / fatigue.\par \par HYPERSOMNIA / FATIGUE:  The patient reports an approximate 15-year history of moderate fatigue, somnolence despite long sleep periods (up to 12-hours with rare 4-hour naps), previously attributed to epilepsy, being a teenager, depression; and affecting daytime function.  She experienced a concussion at age 19 without loss of consciousness.  She denies a prolonged illness at onset.  She falls asleep unintentionally only while inactive, and experiences drowsy driving.  She is on Lexapro 15 mg for ~ one year for depression / anxiety.  She denies narcolepsy symptoms.  There is a family history of hypersomnolence in 3 family members.\par \par Possible DELAYED SLEEP SYNDROME:  The patient works the night shift as a  since Aug/2019 from 5PM to 10PM-2AM.  Her preferred sleep schedule is 2AM-2PM. \par \par SLEEP MAINTENANCE INSOMNIA:  The patient also reports multiple awakenings every 1-2 hours for unclear reasons (checks the time and resumes sleep).  Melatonin 10 mg for 1-month was ineffective.  She was advised on sleep hygiene measures.  \par \par The patient was referred to the [facility] for a diagnostic Polysomnography / Multiple sleep latency Testing to evaluate for narcolepsy without cataplexy or idiopathic hypersomnia.  She was cautioned on the avoidance of drowsy driving. \par \par She will follow up by phone 2-weeks after the sleep study if she has not been contacted by then.\par \par

## 2020-02-28 NOTE — REASON FOR VISIT
[Initial Evaluation] : an initial evaluation [Hypersomnolence] : hypersomnolence  [FreeTextEntry2] : fatigue

## 2020-02-29 ENCOUNTER — EMERGENCY (EMERGENCY)
Facility: HOSPITAL | Age: 25
LOS: 1 days | Discharge: HOME | End: 2020-02-29
Attending: EMERGENCY MEDICINE
Payer: COMMERCIAL

## 2020-02-29 VITALS
HEART RATE: 113 BPM | SYSTOLIC BLOOD PRESSURE: 96 MMHG | RESPIRATION RATE: 18 BRPM | OXYGEN SATURATION: 96 % | TEMPERATURE: 102 F | DIASTOLIC BLOOD PRESSURE: 65 MMHG | WEIGHT: 154.98 LBS | HEIGHT: 63 IN

## 2020-02-29 VITALS — SYSTOLIC BLOOD PRESSURE: 105 MMHG | RESPIRATION RATE: 12 BRPM | DIASTOLIC BLOOD PRESSURE: 65 MMHG | HEART RATE: 94 BPM

## 2020-02-29 LAB
APPEARANCE UR: CLEAR — SIGNIFICANT CHANGE UP
BASOPHILS # BLD AUTO: 0.03 K/UL — SIGNIFICANT CHANGE UP (ref 0–0.2)
BASOPHILS NFR BLD AUTO: 0.6 % — SIGNIFICANT CHANGE UP (ref 0–2)
BILIRUB UR-MCNC: NEGATIVE — SIGNIFICANT CHANGE UP
COLOR SPEC: YELLOW — SIGNIFICANT CHANGE UP
DIFF PNL FLD: NEGATIVE — SIGNIFICANT CHANGE UP
EOSINOPHIL # BLD AUTO: 0 K/UL — SIGNIFICANT CHANGE UP (ref 0–0.5)
EOSINOPHIL NFR BLD AUTO: 0 % — SIGNIFICANT CHANGE UP (ref 0–6)
GLUCOSE UR QL: NEGATIVE — SIGNIFICANT CHANGE UP
HCT VFR BLD CALC: 36.9 % — SIGNIFICANT CHANGE UP (ref 34.5–45)
HGB BLD-MCNC: 12.2 G/DL — SIGNIFICANT CHANGE UP (ref 11.5–15.5)
IMM GRANULOCYTES NFR BLD AUTO: 0.4 % — SIGNIFICANT CHANGE UP (ref 0–1.5)
KETONES UR-MCNC: ABNORMAL
LACTATE BLDV-MCNC: 1.6 MMOL/L — SIGNIFICANT CHANGE UP (ref 0.7–2)
LEUKOCYTE ESTERASE UR-ACNC: NEGATIVE — SIGNIFICANT CHANGE UP
LYMPHOCYTES # BLD AUTO: 0.84 K/UL — LOW (ref 1–3.3)
LYMPHOCYTES # BLD AUTO: 15.7 % — SIGNIFICANT CHANGE UP (ref 13–44)
MCHC RBC-ENTMCNC: 30.7 PG — SIGNIFICANT CHANGE UP (ref 27–34)
MCHC RBC-ENTMCNC: 33.1 GM/DL — SIGNIFICANT CHANGE UP (ref 32–36)
MCV RBC AUTO: 92.7 FL — SIGNIFICANT CHANGE UP (ref 80–100)
MONOCYTES # BLD AUTO: 0.59 K/UL — SIGNIFICANT CHANGE UP (ref 0–0.9)
MONOCYTES NFR BLD AUTO: 11 % — SIGNIFICANT CHANGE UP (ref 2–14)
NEUTROPHILS # BLD AUTO: 3.88 K/UL — SIGNIFICANT CHANGE UP (ref 1.8–7.4)
NEUTROPHILS NFR BLD AUTO: 72.3 % — SIGNIFICANT CHANGE UP (ref 43–77)
NITRITE UR-MCNC: NEGATIVE — SIGNIFICANT CHANGE UP
NRBC # BLD: 0 /100 WBCS — SIGNIFICANT CHANGE UP (ref 0–0)
PH UR: 6 — SIGNIFICANT CHANGE UP (ref 5–8)
PLATELET # BLD AUTO: 262 K/UL — SIGNIFICANT CHANGE UP (ref 150–400)
PROT UR-MCNC: ABNORMAL
RBC # BLD: 3.98 M/UL — SIGNIFICANT CHANGE UP (ref 3.8–5.2)
RBC # FLD: 13.6 % — SIGNIFICANT CHANGE UP (ref 10.3–14.5)
SP GR SPEC: 1.04 — HIGH (ref 1.01–1.02)
UROBILINOGEN FLD QL: NEGATIVE — SIGNIFICANT CHANGE UP
WBC # BLD: 5.36 K/UL — SIGNIFICANT CHANGE UP (ref 3.8–10.5)
WBC # FLD AUTO: 5.36 K/UL — SIGNIFICANT CHANGE UP (ref 3.8–10.5)

## 2020-02-29 PROCEDURE — 81025 URINE PREGNANCY TEST: CPT

## 2020-02-29 PROCEDURE — 85610 PROTHROMBIN TIME: CPT

## 2020-02-29 PROCEDURE — 71046 X-RAY EXAM CHEST 2 VIEWS: CPT | Mod: 26

## 2020-02-29 PROCEDURE — 83605 ASSAY OF LACTIC ACID: CPT

## 2020-02-29 PROCEDURE — 99283 EMERGENCY DEPT VISIT LOW MDM: CPT | Mod: 25

## 2020-02-29 PROCEDURE — 81001 URINALYSIS AUTO W/SCOPE: CPT

## 2020-02-29 PROCEDURE — 87040 BLOOD CULTURE FOR BACTERIA: CPT

## 2020-02-29 PROCEDURE — 85027 COMPLETE CBC AUTOMATED: CPT

## 2020-02-29 PROCEDURE — 80053 COMPREHEN METABOLIC PANEL: CPT

## 2020-02-29 PROCEDURE — 85730 THROMBOPLASTIN TIME PARTIAL: CPT

## 2020-02-29 PROCEDURE — 99284 EMERGENCY DEPT VISIT MOD MDM: CPT

## 2020-02-29 PROCEDURE — 93005 ELECTROCARDIOGRAM TRACING: CPT

## 2020-02-29 PROCEDURE — 71046 X-RAY EXAM CHEST 2 VIEWS: CPT

## 2020-02-29 PROCEDURE — 87086 URINE CULTURE/COLONY COUNT: CPT

## 2020-02-29 PROCEDURE — 93010 ELECTROCARDIOGRAM REPORT: CPT

## 2020-02-29 RX ORDER — SODIUM CHLORIDE 9 MG/ML
1000 INJECTION INTRAMUSCULAR; INTRAVENOUS; SUBCUTANEOUS ONCE
Refills: 0 | Status: COMPLETED | OUTPATIENT
Start: 2020-02-29 | End: 2020-02-29

## 2020-02-29 RX ORDER — ACETAMINOPHEN 500 MG
975 TABLET ORAL ONCE
Refills: 0 | Status: COMPLETED | OUTPATIENT
Start: 2020-02-29 | End: 2020-02-29

## 2020-02-29 RX ADMIN — SODIUM CHLORIDE 1000 MILLILITER(S): 9 INJECTION INTRAMUSCULAR; INTRAVENOUS; SUBCUTANEOUS at 22:34

## 2020-02-29 RX ADMIN — Medication 975 MILLIGRAM(S): at 22:34

## 2020-02-29 NOTE — ED PROVIDER NOTE - PHYSICAL EXAMINATION
GEN: Pt in NAD, A&O x3.  PSYCH: Affect appropriate.  EYES: Sclera white w/o injection.   ENT: Head NCAT. Nose w/o deformity. No auricular TTP. MMM, posterior pharynx erythematous, no exudates, uvula midline, no trismus, no drooling. Voice hoarse. Neck supple FROM.   RESP: No chest wall tenderness, rhonchi throughout which clear with cough, no rales or wheezing.   CARDIAC: Tachycardic, regular, clear distinct S1, S2, no appreciable murmurs.  ABD: Abdomen soft, non-tender. No CVAT b/l.  VASC: 2+ radial and dorsalis pedis pulses b/l. No edema or calf tenderness.  SKIN: No rashes on the trunk.

## 2020-02-29 NOTE — ED PROVIDER NOTE - ATTENDING CONTRIBUTION TO CARE
------------ATTENDING NOTE------------  healthy pt w/ family c/o 4 days of nasal congestion, unproductive cough, sore throat, hoarseness, fevers, c/w viral process, (-)influenza but still started on Tamiflu by urgent care, mild dehydrated on ED arrival, very low suspicion for superimposed bacterial process, tolerating PO, awaiting labs/imaging and close reassessments -->  - Bayron Lyn MD   ---------------------------------------------- ------------ATTENDING NOTE------------  healthy pt w/ family c/o 4 days of nasal congestion, unproductive cough, sore throat, hoarseness, fevers, c/w viral process, (-)influenza but still started on Tamiflu by urgent care, mild dehydrated on ED arrival, very low suspicion for superimposed bacterial process, tolerating PO, awaiting labs/imaging and close reassessments --> CXR clear, (-)UPT, tolerating PO, nml VS at ME, in depth dw all about ddx, tx, [place], continued close outpt fu.  - Bayron Lyn MD   ----------------------------------------------

## 2020-02-29 NOTE — ED PROVIDER NOTE - OBJECTIVE STATEMENT
25 y/o F, no PMHx, vapes, presents c/o fever, cough, sore throat, nasal congestion/rhinorrhea, hoarse voice x 4 days. Cough is intermittently productive of yellow-green sputum. Pt states she went to urgent care yesterday, had negative flu swab and was given Tamiflu, told to go to ER if sxs worsen. Pt was also given albuterol inhaler at , no known h/o asthma, last dose 1 hour PTA. Pt presents today with worsening sxs. Has been taking Tylenol (last dose 500mg at 13:00) and ibuprofen (last dose 400mg at 21:30) with minimal relief. Pt denies abd pain, vomiting, diarrhea, melena, hematochezia, urinary sxs, flank pain, CP, leg pain, edema, SOB, h/o DVT/PE, recent travel, recent hospitalizations or abx. Pt has IUD but denies other hormone use.

## 2020-02-29 NOTE — ED ADULT NURSE NOTE - NSIMPLEMENTINTERV_GEN_ALL_ED
Implemented All Universal Safety Interventions:  [place] to call system. Call bell, personal items and telephone within reach. Instruct patient to call for assistance. Room bathroom lighting operational. Non-slip footwear when patient is off stretcher. Physically safe environment: no spills, clutter or unnecessary equipment. Stretcher in lowest position, wheels locked, appropriate side rails in place.

## 2020-02-29 NOTE — ED PROVIDER NOTE - NSFOLLOWUPINSTRUCTIONS_ED_ALL_ED_FT
See your Primary Doctor this week for follow up -- call to discuss.    Stay well hydrated, eat regular healthy meals, get plenty of rest.    Acetaminophen and/or Ibuprofen as directed for pain/fever -- see medication warnings.    See VIRAL ILLNESS information and return instructions given to you.    Seek immediate medical care for new/worsening symptoms/concerns.

## 2020-02-29 NOTE — ED PROVIDER NOTE - PATIENT PORTAL LINK FT
You can access the FollowMyHealth Patient Portal offered by [facility] by registering at the following website: [email or web address]. By joining [facility]’s FollowMyHealth portal, you will also be able to view your health information using other applications (apps) compatible with our system.

## 2020-02-29 NOTE — ED ADULT NURSE NOTE - OBJECTIVE STATEMENT
24YOF no pmh presents to ED c/o fever, cough, sore throat x4 days. Productive cough with yellow-green. Pt was seen at urgent care, negative flu swab but started on tamiflu. Pt also given nebulizer treatment. pt has been taking motrin, tynenol at home but minimal relief. Denies Cp, recent traveling, sick contact, abd pain, N/V/D, burning upon urination. Safety and comfort measures provided.

## 2020-03-02 LAB
CULTURE RESULTS: SIGNIFICANT CHANGE UP
SPECIMEN SOURCE: SIGNIFICANT CHANGE UP

## 2020-03-03 NOTE — ED POST DISCHARGE NOTE - ADDITIONAL DOCUMENTATION
patient called after care line looking for test results.  I called and left message for her to call [phone] again and someone will try to reach her again.

## 2020-03-06 LAB
CULTURE RESULTS: SIGNIFICANT CHANGE UP
CULTURE RESULTS: SIGNIFICANT CHANGE UP
SPECIMEN SOURCE: SIGNIFICANT CHANGE UP
SPECIMEN SOURCE: SIGNIFICANT CHANGE UP

## 2020-03-09 ENCOUNTER — APPOINTMENT (OUTPATIENT)
Dept: NEUROLOGY | Facility: CLINIC | Age: 25
End: 2020-03-09

## 2020-06-23 DIAGNOSIS — Z01.818 ENCOUNTER FOR OTHER PREPROCEDURAL EXAMINATION: ICD-10-CM

## 2020-06-27 ENCOUNTER — TRANSCRIPTION ENCOUNTER (OUTPATIENT)
Age: 25
End: 2020-06-27

## 2020-06-27 ENCOUNTER — APPOINTMENT (OUTPATIENT)
Dept: DISASTER EMERGENCY | Facility: CLINIC | Age: 25
End: 2020-06-27

## 2020-06-27 LAB — SARS-COV-2 N GENE NPH QL NAA+PROBE: NOT DETECTED

## 2020-06-29 ENCOUNTER — FORM ENCOUNTER (OUTPATIENT)
Age: 25
End: 2020-06-29

## 2020-06-30 ENCOUNTER — FORM ENCOUNTER (OUTPATIENT)
Age: 25
End: 2020-06-30

## 2020-06-30 ENCOUNTER — APPOINTMENT (OUTPATIENT)
Dept: SLEEP CENTER | Facility: CLINIC | Age: 25
End: 2020-06-30
Payer: COMMERCIAL

## 2020-06-30 ENCOUNTER — OUTPATIENT (OUTPATIENT)
Dept: OUTPATIENT SERVICES | Facility: HOSPITAL | Age: 25
LOS: 1 days | End: 2020-06-30
Payer: COMMERCIAL

## 2020-06-30 PROCEDURE — 95810 POLYSOM 6/> YRS 4/> PARAM: CPT | Mod: 26

## 2020-07-01 ENCOUNTER — APPOINTMENT (OUTPATIENT)
Dept: SLEEP CENTER | Facility: CLINIC | Age: 25
End: 2020-07-01
Payer: COMMERCIAL

## 2020-07-01 DIAGNOSIS — G47.33 OBSTRUCTIVE SLEEP APNEA (ADULT) (PEDIATRIC): ICD-10-CM

## 2020-07-01 PROCEDURE — 95805 MULTIPLE SLEEP LATENCY TEST: CPT | Mod: 26

## 2020-07-01 PROCEDURE — 95810 POLYSOM 6/> YRS 4/> PARAM: CPT

## 2020-07-01 PROCEDURE — 95805 MULTIPLE SLEEP LATENCY TEST: CPT

## 2020-07-16 ENCOUNTER — APPOINTMENT (OUTPATIENT)
Dept: PULMONOLOGY | Facility: CLINIC | Age: 25
End: 2020-07-16

## 2020-09-02 ENCOUNTER — TRANSCRIPTION ENCOUNTER (OUTPATIENT)
Age: 25
End: 2020-09-02

## 2020-11-02 ENCOUNTER — TRANSCRIPTION ENCOUNTER (OUTPATIENT)
Age: 25
End: 2020-11-02

## 2021-04-01 ENCOUNTER — APPOINTMENT (OUTPATIENT)
Dept: INTERNAL MEDICINE | Facility: CLINIC | Age: 26
End: 2021-04-01

## 2021-05-01 ENCOUNTER — NON-APPOINTMENT (OUTPATIENT)
Age: 26
End: 2021-05-01

## 2021-05-01 ENCOUNTER — APPOINTMENT (OUTPATIENT)
Dept: INTERNAL MEDICINE | Facility: CLINIC | Age: 26
End: 2021-05-01
Payer: COMMERCIAL

## 2021-05-01 VITALS
RESPIRATION RATE: 14 BRPM | HEART RATE: 68 BPM | HEIGHT: 63.5 IN | DIASTOLIC BLOOD PRESSURE: 72 MMHG | OXYGEN SATURATION: 98 % | TEMPERATURE: 98.1 F | BODY MASS INDEX: 30.45 KG/M2 | WEIGHT: 174 LBS | SYSTOLIC BLOOD PRESSURE: 118 MMHG

## 2021-05-01 DIAGNOSIS — Z80.0 FAMILY HISTORY OF MALIGNANT NEOPLASM OF DIGESTIVE ORGANS: ICD-10-CM

## 2021-05-01 DIAGNOSIS — Z80.3 FAMILY HISTORY OF MALIGNANT NEOPLASM OF BREAST: ICD-10-CM

## 2021-05-01 DIAGNOSIS — Z78.9 OTHER SPECIFIED HEALTH STATUS: ICD-10-CM

## 2021-05-01 PROCEDURE — 99072 ADDL SUPL MATRL&STAF TM PHE: CPT

## 2021-05-01 PROCEDURE — 99385 PREV VISIT NEW AGE 18-39: CPT

## 2021-05-01 NOTE — PHYSICAL EXAM
[No Acute Distress] : no acute distress [Well Nourished] : well nourished [Well Developed] : well developed [Well-Appearing] : well-appearing [Normal Sclera/Conjunctiva] : normal sclera/conjunctiva [EOMI] : extraocular movements intact [PERRL] : pupils equal round and reactive to light [Normal Outer Ear/Nose] : the outer ears and nose were normal in appearance [Normal Oropharynx] : the oropharynx was normal [No JVD] : no jugular venous distention [No Lymphadenopathy] : no lymphadenopathy [Supple] : supple [Thyroid Normal, No Nodules] : the thyroid was normal and there were no nodules present [No Accessory Muscle Use] : no accessory muscle use [No Respiratory Distress] : no respiratory distress  [Clear to Auscultation] : lungs were clear to auscultation bilaterally [Normal Rate] : normal rate  [Regular Rhythm] : with a regular rhythm [Normal S1, S2] : normal S1 and S2 [No Murmur] : no murmur heard [No Carotid Bruits] : no carotid bruits [No Abdominal Bruit] : a ~M bruit was not heard ~T in the abdomen [No Varicosities] : no varicosities [Pedal Pulses Present] : the pedal pulses are present [No Edema] : there was no peripheral edema [No Palpable Aorta] : no palpable aorta [No Extremity Clubbing/Cyanosis] : no extremity clubbing/cyanosis [Soft] : abdomen soft [Non Tender] : non-tender [Non-distended] : non-distended [No Masses] : no abdominal mass palpated [No HSM] : no HSM [Normal Bowel Sounds] : normal bowel sounds [Normal Posterior Cervical Nodes] : no posterior cervical lymphadenopathy [Normal Anterior Cervical Nodes] : no anterior cervical lymphadenopathy [No CVA Tenderness] : no CVA  tenderness [No Spinal Tenderness] : no spinal tenderness [No Joint Swelling] : no joint swelling [Grossly Normal Strength/Tone] : grossly normal strength/tone [No Rash] : no rash [Coordination Grossly Intact] : coordination grossly intact [No Focal Deficits] : no focal deficits [Deep Tendon Reflexes (DTR)] : deep tendon reflexes were 2+ and symmetric [Normal Gait] : normal gait [Normal Affect] : the affect was normal [Normal Insight/Judgement] : insight and judgment were intact

## 2021-05-01 NOTE — HISTORY OF PRESENT ILLNESS
[de-identified] : 25 y.o. F with PMHx of epilepsy not on any meds (last seizure at 13 y.o.), anxiety and borderline personality disorder presents as new pt to establish care. Pt sees psych-Dr. Jimenez.  Pt complaining of PND for past few weeks.

## 2021-05-03 LAB
25(OH)D3 SERPL-MCNC: 13.9 NG/ML
ALBUMIN SERPL ELPH-MCNC: 4.7 G/DL
ALP BLD-CCNC: 79 U/L
ALT SERPL-CCNC: 8 U/L
ANION GAP SERPL CALC-SCNC: 11 MMOL/L
AST SERPL-CCNC: 14 U/L
BASOPHILS # BLD AUTO: 0.06 K/UL
BASOPHILS NFR BLD AUTO: 0.6 %
BILIRUB SERPL-MCNC: 0.5 MG/DL
BUN SERPL-MCNC: 12 MG/DL
C TRACH RRNA SPEC QL NAA+PROBE: NOT DETECTED
CALCIUM SERPL-MCNC: 9.9 MG/DL
CHLORIDE SERPL-SCNC: 102 MMOL/L
CHOLEST SERPL-MCNC: 178 MG/DL
CO2 SERPL-SCNC: 23 MMOL/L
CREAT SERPL-MCNC: 0.72 MG/DL
EOSINOPHIL # BLD AUTO: 0.06 K/UL
EOSINOPHIL NFR BLD AUTO: 0.6 %
ESTIMATED AVERAGE GLUCOSE: 100 MG/DL
FOLATE SERPL-MCNC: 13.1 NG/ML
GLUCOSE SERPL-MCNC: 87 MG/DL
HBA1C MFR BLD HPLC: 5.1 %
HCT VFR BLD CALC: 37.8 %
HDLC SERPL-MCNC: 49 MG/DL
HGB BLD-MCNC: 12.2 G/DL
HIV1+2 AB SPEC QL IA.RAPID: NONREACTIVE
IMM GRANULOCYTES NFR BLD AUTO: 0.3 %
LDLC SERPL CALC-MCNC: 105 MG/DL
LYMPHOCYTES # BLD AUTO: 2.01 K/UL
LYMPHOCYTES NFR BLD AUTO: 21.3 %
MAN DIFF?: NORMAL
MCHC RBC-ENTMCNC: 29.6 PG
MCHC RBC-ENTMCNC: 32.3 GM/DL
MCV RBC AUTO: 91.7 FL
MONOCYTES # BLD AUTO: 0.66 K/UL
MONOCYTES NFR BLD AUTO: 7 %
N GONORRHOEA RRNA SPEC QL NAA+PROBE: NOT DETECTED
NEUTROPHILS # BLD AUTO: 6.63 K/UL
NEUTROPHILS NFR BLD AUTO: 70.2 %
NONHDLC SERPL-MCNC: 129 MG/DL
PLATELET # BLD AUTO: 307 K/UL
POTASSIUM SERPL-SCNC: 4.1 MMOL/L
PROT SERPL-MCNC: 7.9 G/DL
RBC # BLD: 4.12 M/UL
RBC # FLD: 13.5 %
SODIUM SERPL-SCNC: 137 MMOL/L
SOURCE AMPLIFICATION: NORMAL
T PALLIDUM AB SER QL IA: NEGATIVE
TRIGL SERPL-MCNC: 121 MG/DL
TSH SERPL-ACNC: 1.24 UIU/ML
VIT B12 SERPL-MCNC: 415 PG/ML
WBC # FLD AUTO: 9.45 K/UL

## 2021-12-08 ENCOUNTER — TRANSCRIPTION ENCOUNTER (OUTPATIENT)
Age: 26
End: 2021-12-08

## 2022-01-31 ENCOUNTER — APPOINTMENT (OUTPATIENT)
Dept: INTERNAL MEDICINE | Facility: CLINIC | Age: 27
End: 2022-01-31
Payer: COMMERCIAL

## 2022-01-31 VITALS
OXYGEN SATURATION: 98 % | HEIGHT: 63.5 IN | RESPIRATION RATE: 14 BRPM | TEMPERATURE: 98.3 F | SYSTOLIC BLOOD PRESSURE: 110 MMHG | DIASTOLIC BLOOD PRESSURE: 70 MMHG | BODY MASS INDEX: 29.75 KG/M2 | HEART RATE: 101 BPM | WEIGHT: 170 LBS

## 2022-01-31 DIAGNOSIS — F41.9 ANXIETY DISORDER, UNSPECIFIED: ICD-10-CM

## 2022-01-31 DIAGNOSIS — F32.A ANXIETY DISORDER, UNSPECIFIED: ICD-10-CM

## 2022-01-31 PROCEDURE — 99213 OFFICE O/P EST LOW 20 MIN: CPT

## 2022-01-31 RX ORDER — ESCITALOPRAM OXALATE 10 MG/1
10 TABLET ORAL
Refills: 0 | Status: DISCONTINUED | COMMUNITY
Start: 2019-03-05 | End: 2022-01-31

## 2022-01-31 NOTE — HISTORY OF PRESENT ILLNESS
[de-identified] : Pt here for f/u of PMDD and anxiety/depression. She is taking zoloft 100mg and needs renewal. Previously taking lexapro but did not work well for her. Has been on zoloft since May and doing much better.

## 2022-01-31 NOTE — PHYSICAL EXAM
[No Acute Distress] : no acute distress [Well-Appearing] : well-appearing [Normal Voice/Communication] : normal voice/communication [No Respiratory Distress] : no respiratory distress  [No Accessory Muscle Use] : no accessory muscle use [Clear to Auscultation] : lungs were clear to auscultation bilaterally [Normal Rate] : normal rate  [Regular Rhythm] : with a regular rhythm [No Murmur] : no murmur heard [No Focal Deficits] : no focal deficits

## 2022-03-29 ENCOUNTER — TRANSCRIPTION ENCOUNTER (OUTPATIENT)
Age: 27
End: 2022-03-29

## 2022-06-02 NOTE — H&P PST ADULT -  CURRENT SWALLOWING
Gps/Lvn- Remains up in her elsie-chair for her safety. Hallucinating seeing crocodile on the 
floor, constantly talking speech confused and incoherent. Poor safety awareness. (0) swallows foods and liquids w/o difficulty

## 2022-06-23 ENCOUNTER — RX RENEWAL (OUTPATIENT)
Age: 27
End: 2022-06-23

## 2022-07-12 ENCOUNTER — APPOINTMENT (OUTPATIENT)
Dept: INTERNAL MEDICINE | Facility: CLINIC | Age: 27
End: 2022-07-12

## 2022-07-12 ENCOUNTER — NON-APPOINTMENT (OUTPATIENT)
Age: 27
End: 2022-07-12

## 2022-07-12 VITALS
RESPIRATION RATE: 14 BRPM | SYSTOLIC BLOOD PRESSURE: 116 MMHG | OXYGEN SATURATION: 98 % | WEIGHT: 182 LBS | HEART RATE: 91 BPM | BODY MASS INDEX: 31.85 KG/M2 | DIASTOLIC BLOOD PRESSURE: 70 MMHG | HEIGHT: 63.5 IN

## 2022-07-12 PROCEDURE — 99395 PREV VISIT EST AGE 18-39: CPT

## 2022-07-14 LAB
25(OH)D3 SERPL-MCNC: 24.4 NG/ML
ALBUMIN SERPL ELPH-MCNC: 4.5 G/DL
ALP BLD-CCNC: 83 U/L
ALT SERPL-CCNC: 11 U/L
ANION GAP SERPL CALC-SCNC: 12 MMOL/L
AST SERPL-CCNC: 16 U/L
BASOPHILS # BLD AUTO: 0.04 K/UL
BASOPHILS NFR BLD AUTO: 0.5 %
BILIRUB SERPL-MCNC: 0.2 MG/DL
BUN SERPL-MCNC: 9 MG/DL
CALCIUM SERPL-MCNC: 9.9 MG/DL
CHLORIDE SERPL-SCNC: 103 MMOL/L
CHOLEST SERPL-MCNC: 229 MG/DL
CO2 SERPL-SCNC: 23 MMOL/L
CREAT SERPL-MCNC: 0.73 MG/DL
EGFR: 116 ML/MIN/1.73M2
EOSINOPHIL # BLD AUTO: 0.04 K/UL
EOSINOPHIL NFR BLD AUTO: 0.5 %
ESTIMATED AVERAGE GLUCOSE: 105 MG/DL
FOLATE SERPL-MCNC: 13.6 NG/ML
GLUCOSE SERPL-MCNC: 90 MG/DL
HBA1C MFR BLD HPLC: 5.3 %
HCT VFR BLD CALC: 37.8 %
HDLC SERPL-MCNC: 45 MG/DL
HGB BLD-MCNC: 12 G/DL
IMM GRANULOCYTES NFR BLD AUTO: 0.2 %
LDLC SERPL CALC-MCNC: 153 MG/DL
LYMPHOCYTES # BLD AUTO: 1.95 K/UL
LYMPHOCYTES NFR BLD AUTO: 23.2 %
MAN DIFF?: NORMAL
MCHC RBC-ENTMCNC: 29.3 PG
MCHC RBC-ENTMCNC: 31.7 GM/DL
MCV RBC AUTO: 92.2 FL
MONOCYTES # BLD AUTO: 0.58 K/UL
MONOCYTES NFR BLD AUTO: 6.9 %
NEUTROPHILS # BLD AUTO: 5.76 K/UL
NEUTROPHILS NFR BLD AUTO: 68.7 %
NONHDLC SERPL-MCNC: 185 MG/DL
PLATELET # BLD AUTO: 292 K/UL
POTASSIUM SERPL-SCNC: 4.1 MMOL/L
PROT SERPL-MCNC: 7.8 G/DL
RBC # BLD: 4.1 M/UL
RBC # FLD: 14.5 %
SODIUM SERPL-SCNC: 138 MMOL/L
TRIGL SERPL-MCNC: 159 MG/DL
TSH SERPL-ACNC: 1.97 UIU/ML
VIT B12 SERPL-MCNC: 470 PG/ML
WBC # FLD AUTO: 8.39 K/UL

## 2022-07-14 NOTE — PLAN
[FreeTextEntry1] : HCM:\par -check labs\par \par Anxiety: discussed switching to a potentially more weight-neutral SSRI but pt declining, continue zoloft \par Weight gain: discussed diet and exercise goals \par Palpitation: EKG NSR, likely experiencing occasional PVCs, recommend cutting down on caffeine, f/u with cardio if persistent or becoming more frequent

## 2022-07-14 NOTE — REVIEW OF SYSTEMS
[Negative] : Heme/Lymph [Recent Change In Weight] : ~T recent weight change [FreeTextEntry5] : see hpi

## 2022-07-14 NOTE — HISTORY OF PRESENT ILLNESS
[de-identified] : Pt here for CPE. Frustrated with weight gain from taking zoloft. She eats a balanced diet and exercises 3-4 times a week. She is also complaining of feeling an irregular beat here and there. She does drink a lot of coffee to help with her constipation. No association CP, SOB.

## 2022-07-24 ENCOUNTER — NON-APPOINTMENT (OUTPATIENT)
Age: 27
End: 2022-07-24

## 2022-07-25 ENCOUNTER — RX RENEWAL (OUTPATIENT)
Age: 27
End: 2022-07-25

## 2022-08-24 ENCOUNTER — APPOINTMENT (OUTPATIENT)
Dept: INTERNAL MEDICINE | Facility: CLINIC | Age: 27
End: 2022-08-24

## 2022-08-24 VITALS
HEART RATE: 93 BPM | RESPIRATION RATE: 16 BRPM | WEIGHT: 180 LBS | TEMPERATURE: 97.6 F | BODY MASS INDEX: 31.5 KG/M2 | OXYGEN SATURATION: 97 % | HEIGHT: 63.5 IN | SYSTOLIC BLOOD PRESSURE: 122 MMHG | DIASTOLIC BLOOD PRESSURE: 64 MMHG

## 2022-08-24 DIAGNOSIS — R09.82 POSTNASAL DRIP: ICD-10-CM

## 2022-08-24 DIAGNOSIS — R05.9 COUGH, UNSPECIFIED: ICD-10-CM

## 2022-08-24 PROCEDURE — 99214 OFFICE O/P EST MOD 30 MIN: CPT

## 2022-08-24 NOTE — HISTORY OF PRESENT ILLNESS
[Moderate] : moderate [Episodic] : episodic  [Congestion] : congestion [Cough] : cough [Stable] : stable [Sore Throat] : no sore throat [Wheezing] : no wheezing [Chills] : no chills [Anorexia] : no anorexia [Shortness Of Breath] : no shortness of breath [Fatigue] : not fatigue [Headache] : no headache [Fever] : no fever [FreeTextEntry8] : MARIE LORENZO is a 27 year old F who presents today for cough\par treated for pneumonia about 1 month went to Urgent care given Doxycycline\par increased cough no fever

## 2022-08-24 NOTE — PHYSICAL EXAM
[No Acute Distress] : no acute distress [Well Nourished] : well nourished [Well Developed] : well developed [Well-Appearing] : well-appearing [Normal Sclera/Conjunctiva] : normal sclera/conjunctiva [PERRL] : pupils equal round and reactive to light [EOMI] : extraocular movements intact [Normal Outer Ear/Nose] : the outer ears and nose were normal in appearance [Normal Oropharynx] : the oropharynx was normal [Normal TMs] : both tympanic membranes were normal [No JVD] : no jugular venous distention [No Lymphadenopathy] : no lymphadenopathy [Supple] : supple [Thyroid Normal, No Nodules] : the thyroid was normal and there were no nodules present [No Respiratory Distress] : no respiratory distress  [No Accessory Muscle Use] : no accessory muscle use [Clear to Auscultation] : lungs were clear to auscultation bilaterally [Normal Rate] : normal rate  [Regular Rhythm] : with a regular rhythm [Normal S1, S2] : normal S1 and S2 [No Murmur] : no murmur heard [No Carotid Bruits] : no carotid bruits [No Abdominal Bruit] : a ~M bruit was not heard ~T in the abdomen [No Varicosities] : no varicosities [Pedal Pulses Present] : the pedal pulses are present [No Edema] : there was no peripheral edema [No Palpable Aorta] : no palpable aorta [No Extremity Clubbing/Cyanosis] : no extremity clubbing/cyanosis [Soft] : abdomen soft [Non Tender] : non-tender [Non-distended] : non-distended [No Masses] : no abdominal mass palpated [No HSM] : no HSM [Normal Bowel Sounds] : normal bowel sounds [Normal Supraclavicular Nodes] : no supraclavicular lymphadenopathy [Normal Posterior Cervical Nodes] : no posterior cervical lymphadenopathy [Normal Anterior Cervical Nodes] : no anterior cervical lymphadenopathy [No CVA Tenderness] : no CVA  tenderness [No Spinal Tenderness] : no spinal tenderness [No Joint Swelling] : no joint swelling [Grossly Normal Strength/Tone] : grossly normal strength/tone [No Rash] : no rash [Coordination Grossly Intact] : coordination grossly intact [No Focal Deficits] : no focal deficits [Normal Gait] : normal gait [Deep Tendon Reflexes (DTR)] : deep tendon reflexes were 2+ and symmetric [Speech Grossly Normal] : speech grossly normal [Memory Grossly Normal] : memory grossly normal [Normal Affect] : the affect was normal [Alert and Oriented x3] : oriented to person, place, and time [Normal Mood] : the mood was normal [Normal Insight/Judgement] : insight and judgment were intact

## 2022-08-24 NOTE — HEALTH RISK ASSESSMENT
[Never] : Never [No] : In the past 12 months have you used drugs other than those required for medical reasons? No [No falls in past year] : Patient reported no falls in the past year [0] : 2) Feeling down, depressed, or hopeless: Not at all (0) [PHQ-2 Negative - No further assessment needed] : PHQ-2 Negative - No further assessment needed [[id number]] : 0

## 2022-08-25 ENCOUNTER — OUTPATIENT (OUTPATIENT)
Dept: OUTPATIENT SERVICES | Facility: HOSPITAL | Age: 27
LOS: 1 days | End: 2022-08-25
Payer: MEDICAID

## 2022-08-25 ENCOUNTER — APPOINTMENT (OUTPATIENT)
Dept: RADIOLOGY | Facility: CLINIC | Age: 27
End: 2022-08-25

## 2022-08-25 DIAGNOSIS — R05.9 COUGH, UNSPECIFIED: ICD-10-CM

## 2022-08-25 PROCEDURE — 71046 X-RAY EXAM CHEST 2 VIEWS: CPT | Mod: 26

## 2022-08-25 PROCEDURE — 71046 X-RAY EXAM CHEST 2 VIEWS: CPT

## 2022-10-25 ENCOUNTER — RX RENEWAL (OUTPATIENT)
Age: 27
End: 2022-10-25

## 2022-12-17 ENCOUNTER — NON-APPOINTMENT (OUTPATIENT)
Age: 27
End: 2022-12-17

## 2023-02-13 ENCOUNTER — RX RENEWAL (OUTPATIENT)
Age: 28
End: 2023-02-13

## 2023-03-05 ENCOUNTER — NON-APPOINTMENT (OUTPATIENT)
Age: 28
End: 2023-03-05

## 2023-03-13 ENCOUNTER — APPOINTMENT (OUTPATIENT)
Dept: INTERNAL MEDICINE | Facility: CLINIC | Age: 28
End: 2023-03-13
Payer: MEDICAID

## 2023-03-13 ENCOUNTER — NON-APPOINTMENT (OUTPATIENT)
Age: 28
End: 2023-03-13

## 2023-03-13 VITALS
TEMPERATURE: 99.1 F | HEART RATE: 95 BPM | HEIGHT: 63 IN | RESPIRATION RATE: 16 BRPM | DIASTOLIC BLOOD PRESSURE: 68 MMHG | WEIGHT: 165 LBS | BODY MASS INDEX: 29.23 KG/M2 | OXYGEN SATURATION: 98 % | SYSTOLIC BLOOD PRESSURE: 118 MMHG

## 2023-03-13 VITALS — HEART RATE: 78 BPM | OXYGEN SATURATION: 98 % | DIASTOLIC BLOOD PRESSURE: 70 MMHG | SYSTOLIC BLOOD PRESSURE: 98 MMHG

## 2023-03-13 VITALS — SYSTOLIC BLOOD PRESSURE: 98 MMHG | HEART RATE: 84 BPM | OXYGEN SATURATION: 99 % | DIASTOLIC BLOOD PRESSURE: 66 MMHG

## 2023-03-13 VITALS — DIASTOLIC BLOOD PRESSURE: 58 MMHG | OXYGEN SATURATION: 98 % | HEART RATE: 74 BPM | SYSTOLIC BLOOD PRESSURE: 100 MMHG

## 2023-03-13 PROCEDURE — 99395 PREV VISIT EST AGE 18-39: CPT

## 2023-03-13 RX ORDER — FLUTICASONE PROPIONATE 50 UG/1
50 SPRAY, METERED NASAL DAILY
Qty: 1 | Refills: 0 | Status: DISCONTINUED | COMMUNITY
Start: 2022-08-24 | End: 2023-03-13

## 2023-03-13 NOTE — REVIEW OF SYSTEMS
[Recent Change In Weight] : ~T recent weight change [Palpitations] : palpitations [Dysuria] : dysuria [Frequency] : frequency [Negative] : Heme/Lymph [Fever] : no fever [Chills] : no chills [Fatigue] : no fatigue [Chest Pain] : no chest pain [FreeTextEntry2] : weight loss (30lb) 2-3 months [de-identified] : see hpi

## 2023-03-13 NOTE — HISTORY OF PRESENT ILLNESS
[FreeTextEntry1] : CPE [de-identified] : 26 yo F here for CPE. Patient has noticed that she has lost 30 lbs over the past several months. She has not changed her eating habits significantly and does not work out. 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 She still has a good appetite. Denies abdominal pain, n/v but does admit to bloating and diarrhea/constipation consistent with IBS.  Had stomach virus a week ago with vomiting, diarrhea. Also c/o of sporadic episodes of lightheadedness when she changes position and her vision will black out momentarily. Patient used to have these episodes since childhood with sudden lower extremity weakness as well. It was never formally worked up. She has a hx of seizures when she was a child, however, those presented very differently. She would lose consciousness and have an aura prior to having one. She has not had a seizure since she was 13 years old and she is not on any meds. She denies headaches, paraesthesias.  Also admits to dysuria for 1 week. Patient is sexually active with 1 male partner, does not use protection or birth control. She has heavy periods. \par \par \par  \par

## 2023-03-13 NOTE — PLAN
[FreeTextEntry1] : HCM\par - check labs\par - to see OBGYN later today - [facility] for pap smear \par \par Lightheadedness\par - pt mildly orthostatic with >10 mmhg drop in DBP from supine to sitting, BP also tends to run low \par - EKG NSR, rate 72\par - recommend compression socks, staying well hydrated, changing positions slowly, counseled on counterpressure maneuvers \par - f/u with cardio and neuro \par - does not appear to be typical of her seizures \par \par Dysuria with suprapubic pain on exam\par - Check UA/urine cx\par \par Anxiety\par - Continue sertraline\par \par Chronic constipation/diarrhea likely 2/2 ?IBS\par - Patient to see GI \par - Provided info for gastroenterology

## 2023-03-13 NOTE — PHYSICAL EXAM
[No Acute Distress] : no acute distress [Normal Sclera/Conjunctiva] : normal sclera/conjunctiva [PERRL] : pupils equal round and reactive to light [Normal Oropharynx] : the oropharynx was normal [No Lymphadenopathy] : no lymphadenopathy [Supple] : supple [No Respiratory Distress] : no respiratory distress  [No Accessory Muscle Use] : no accessory muscle use [Clear to Auscultation] : lungs were clear to auscultation bilaterally [Normal Rate] : normal rate  [Regular Rhythm] : with a regular rhythm [Normal S1, S2] : normal S1 and S2 [Pedal Pulses Present] : the pedal pulses are present [No Edema] : there was no peripheral edema [Soft] : abdomen soft [Non-distended] : non-distended [Normal Bowel Sounds] : normal bowel sounds [No CVA Tenderness] : no CVA  tenderness [No Joint Swelling] : no joint swelling [Grossly Normal Strength/Tone] : grossly normal strength/tone [No Rash] : no rash [Coordination Grossly Intact] : coordination grossly intact [No Focal Deficits] : no focal deficits [Normal Gait] : normal gait [Normal Affect] : the affect was normal [Normal Insight/Judgement] : insight and judgment were intact [de-identified] : +TTP suprapubic region, LLQ

## 2023-03-13 NOTE — HEALTH RISK ASSESSMENT
[Yes] : Yes [2 - 4 times a month (2 pts)] : 2-4 times a month (2 points) [1 or 2 (0 pts)] : 1 or 2 (0 points) [Never (0 pts)] : Never (0 points) [No falls in past year] : Patient reported no falls in the past year [0] : 2) Feeling down, depressed, or hopeless: Not at all (0) [Several Days (1)] : 5.) Poor appetite or overeating? Several days [Not at All (0)] : 8.) Moving or speaking so slowly that other people could have noticed, or the opposite, moving or speaking faster than usual? Not at all [Not at all] : How difficult have these problems made it for you to do your work, take care of things at home, or get along with people? Not at all [Fully functional (bathing, dressing, toileting, transferring, walking, feeding)] : Fully functional (bathing, dressing, toileting, transferring, walking, feeding) [Fully functional (using the telephone, shopping, preparing meals, housekeeping, doing laundry, using] : Fully functional and needs no help or supervision to perform IADLs (using the telephone, shopping, preparing meals, housekeeping, doing laundry, using transportation, managing medications and managing finances) [Never] : Never [de-identified] : ENT for persistent cough, found to be acid reflux [Audit-CScore] : 2 [de-identified] : skips breakfast, lunch - sandwich, avocado toast, dinner - homemade meals fish/brown rice/salad [[id number]] : 0 [[id number]] : 3 [PapSmearDate] : 3/2023 [PapSmearComments] : Patient to see OBGYN today 3/13/2023 [de-identified] : patient to see optometrist soon [de-identified] : smokes marijuana a few times per week, shrooms

## 2023-03-14 LAB
25(OH)D3 SERPL-MCNC: 36.6 NG/ML
ALBUMIN SERPL ELPH-MCNC: 4.7 G/DL
ALP BLD-CCNC: 78 U/L
ALT SERPL-CCNC: 9 U/L
ANION GAP SERPL CALC-SCNC: 19 MMOL/L
APPEARANCE: CLEAR
AST SERPL-CCNC: 15 U/L
BACTERIA UR CULT: NORMAL
BACTERIA: NEGATIVE
BASOPHILS # BLD AUTO: 0.07 K/UL
BASOPHILS NFR BLD AUTO: 0.8 %
BILIRUB SERPL-MCNC: 0.2 MG/DL
BILIRUBIN URINE: NEGATIVE
BLOOD URINE: NEGATIVE
BUN SERPL-MCNC: 13 MG/DL
CALCIUM SERPL-MCNC: 9.7 MG/DL
CHLORIDE SERPL-SCNC: 101 MMOL/L
CHOLEST SERPL-MCNC: 230 MG/DL
CO2 SERPL-SCNC: 20 MMOL/L
COLOR: YELLOW
CREAT SERPL-MCNC: 0.78 MG/DL
EGFR: 107 ML/MIN/1.73M2
EOSINOPHIL # BLD AUTO: 0.09 K/UL
EOSINOPHIL NFR BLD AUTO: 1 %
ESTIMATED AVERAGE GLUCOSE: 94 MG/DL
FERRITIN SERPL-MCNC: 19 NG/ML
GLUCOSE QUALITATIVE U: NEGATIVE
GLUCOSE SERPL-MCNC: 70 MG/DL
HBA1C MFR BLD HPLC: 4.9 %
HCT VFR BLD CALC: 39.1 %
HDLC SERPL-MCNC: 44 MG/DL
HGB BLD-MCNC: 12.4 G/DL
HYALINE CASTS: 2 /LPF
IMM GRANULOCYTES NFR BLD AUTO: 0.1 %
IRON SATN MFR SERPL: 22 %
IRON SERPL-MCNC: 91 UG/DL
KETONES URINE: NEGATIVE
LDLC SERPL CALC-MCNC: 115 MG/DL
LEUKOCYTE ESTERASE URINE: NEGATIVE
LYMPHOCYTES # BLD AUTO: 2.42 K/UL
LYMPHOCYTES NFR BLD AUTO: 26.9 %
MAN DIFF?: NORMAL
MCHC RBC-ENTMCNC: 29 PG
MCHC RBC-ENTMCNC: 31.7 GM/DL
MCV RBC AUTO: 91.6 FL
MICROSCOPIC-UA: NORMAL
MONOCYTES # BLD AUTO: 0.78 K/UL
MONOCYTES NFR BLD AUTO: 8.7 %
NEUTROPHILS # BLD AUTO: 5.63 K/UL
NEUTROPHILS NFR BLD AUTO: 62.5 %
NITRITE URINE: NEGATIVE
NONHDLC SERPL-MCNC: 186 MG/DL
PH URINE: 6.5
PLATELET # BLD AUTO: 293 K/UL
POTASSIUM SERPL-SCNC: 4.2 MMOL/L
PROT SERPL-MCNC: 8 G/DL
PROTEIN URINE: NORMAL
RBC # BLD: 4.27 M/UL
RBC # FLD: 14.6 %
RED BLOOD CELLS URINE: 5 /HPF
SODIUM SERPL-SCNC: 139 MMOL/L
SPECIFIC GRAVITY URINE: 1.03
SQUAMOUS EPITHELIAL CELLS: 1 /HPF
TIBC SERPL-MCNC: 408 UG/DL
TRIGL SERPL-MCNC: 353 MG/DL
TSH SERPL-ACNC: 1.8 UIU/ML
UIBC SERPL-MCNC: 318 UG/DL
UROBILINOGEN URINE: NORMAL
WBC # FLD AUTO: 9 K/UL
WHITE BLOOD CELLS URINE: 1 /HPF

## 2023-06-25 ENCOUNTER — RX RENEWAL (OUTPATIENT)
Age: 28
End: 2023-06-25

## 2023-07-19 NOTE — ED PROVIDER NOTE - GASTROINTESTINAL [+], MLM
Neha Us is a 22 year old presenting for FOB appointment  /62   Ht 5' 2\" (1.575 m)   Wt 62.1 kg (137 lb)   LMP 2022 (Exact Date)         Patient's last menstrual period was 2022 (exact date).  Body mass index is 25.06 kg/m².    Last pap smear: Patient declined pap today  Any history of abnormal pap smears: No   ** IF abnormal, procedure done:      Denies known Latex allergy or symptoms of Latex sensitivity.  Medications reviewed and updated.    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 Patient instructed to have these done.  Patient given after hours call center number and instructed on the use of it.     GENETIC HISTORY (Includes Patient, FOB and Anyone in either family)  · Patient's age 35 years or older as of estimated date of delivery: NO  · Thalassemia (Italian, Greek, Mediterranean, or  background): MCV less than 80: NO  · Neural tube defect (meningomyelocele, spina bifida, anencephaly): NO  · Congenital heart defect: NO  · Down Syndrome: NO  · John-Sachs (Ashkenazi Pentecostal, Cajun, Mozambican Garber): NO  · Canavan Disease (Ashkenazi Pentecostal): NO  · Familial Dysautonomia (Ashkenazi Pentecostal):NO   · Sickle cell disease or trait (): NO  · Hemophilia or other blood disorders: NO  · Muscular dystrophy: NO  · Cystic Fibrosis: NO  · Courtland's Chorea: NO  · Mental Retardation: NO  · IF YES, was patient tested for Fragile X?: NO  · Other inherited genetic or chromosomal disorder: NO  · Maternal metabolic disorder (eg. Type 1 diabetes, PKU): NO  · Patient or baby's father had child with birth defects not listed above: NO  · Recurrent pregnancy loss, or a still birth: NO  · Medications, illicit/recreational drugs/alcohol since LMP: YES  · IF YES,  agent(s) and strength/dosage: Prenatal Vitamins    MEDICAL/SURGICAL HISTORY         · Diabetes or gestational diabetes: NO     · High blood pressure or preeclampsia:NO     · Heart disease, mitral valve prolapse, or rheumatic fever:NO  · An autoimmune disease such as lupus or rheumatoid arthritis:NO     · Kidney disease, pyelonephritis, or multiple UTI's:NO  · Epilepsy, seizures or spells:NO      · Migraine headaches: NO       · A stroke or loss of function or sensation:NO    · Any other neurological problems:NO      · Treated for depression:NO       · Problems with crying spells or low self-esteem:NO   · Required psychiatric care:NO   · During the past 7 days have you felt little interest or pleasure in doing things NO  · During the past 7 days have you been bothered by feeling down, depressed, or hopeless NO     · Hepatitis, liver disease or jaundice:NO     · Treated for blood clots in your veins, deep venous thrombosis, thrombosis phlebitis, pulmonary embolism or varicosities:NO    · Is your blood type Rh negative or received Rhogam in previous pregnancies:NO  · Excessive bleeding after surgery or dental work:NO   · Bleed more than other women after a cut or scratch:NO  · History of anemia:NO   · Have you ever received a blood transfusion/products: NO  · Would you refuse blood products if it was medically necessary:NO        · Thyroid problems or taken thyroid medication:NO    · Any other endocrine problems:NO      · Been in a major accident or suffered serious trauma:NO  · Within the last year, has anyone forced you to have sex when you didn't want  to:NO   · Within the last year, has anyone hit, slapped, kicked or otherwise hurt you: NO  · Use any tobacco products or e-cigarettes:NO    · Does anyone smoke in your home:NO     · Use any of the following: marijuana, speed, cocaine, heroin, hallucinogens, or other drugs:NO        · Had asthma:NO        · Any allergies to drugs or over-the-counter  medications:NO  · Had any breast problems:NO       · Ever :NO        · Abnormal pap smears:NO      · Ever had any gynecological surgical procedures such as cervical conization, a LEEP procedure, laser treatment, cryosurgery of the cervix, or a dilatation and curettage, etc:NO      · Any other surgical procedures:NO      · Ever been hospitalized for a non surgical procedure:NO  · Any anesthetic complications:NO      · History of medical problems in your family which you feel might adversely affect your health or pregnancy:NO      · Do you have any other problems we have not asked about which you feel may be important to this pregnancy:NO    INFECTION HISTORY        · Do you object to being tested for HIV:NO    · Have you ever received BCG vaccine:NO     · Have you ever been treated for tuberculosis:NO   · Have you ever had a positive tuberculosis skin test:NO  · Have you ever been exposed to tuberculosis:NO   · Do you have or have you had: gonorrhea, chlamydia, HPV,syphilis, trichomonas, genital herpes, HIV/AIDS, genital warts, pelvic inflammatory disease or any other sexually transmitted disease:NO   · Have you ever had a partner with genital herpes:NO   · Have you had chicken pox:NO  · If NO, have you been vaccinated against chickenpox:YES  · Have you ever had COVID-19? YES If so, when: 9/2021  · Have you reveived the COVID-19 vaccine:NO Dates of immunizations:   · Have you had any other infectious disease:NO    · Do you have any cats/litter boxes in the house:YES, Patient aware not to change litter.   · Hx of Parvovirus (Fifth Disease):NO     · Rash, Viral, or Febrile Illness since LMP:NO  · Recent Travel:  NO     ABDOMINAL PAIN

## 2023-09-01 ENCOUNTER — APPOINTMENT (OUTPATIENT)
Dept: INTERNAL MEDICINE | Facility: CLINIC | Age: 28
End: 2023-09-01
Payer: MEDICAID

## 2023-09-01 VITALS
HEIGHT: 63 IN | TEMPERATURE: 98.5 F | SYSTOLIC BLOOD PRESSURE: 100 MMHG | OXYGEN SATURATION: 98 % | RESPIRATION RATE: 14 BRPM | HEART RATE: 92 BPM | DIASTOLIC BLOOD PRESSURE: 60 MMHG | WEIGHT: 157 LBS | BODY MASS INDEX: 27.82 KG/M2

## 2023-09-01 DIAGNOSIS — Z02.89 ENCOUNTER FOR OTHER ADMINISTRATIVE EXAMINATIONS: ICD-10-CM

## 2023-09-01 DIAGNOSIS — Z83.49 FAMILY HISTORY OF OTHER ENDOCRINE, NUTRITIONAL AND METABOLIC DISEASES: ICD-10-CM

## 2023-09-01 DIAGNOSIS — Z86.39 PERSONAL HISTORY OF OTHER ENDOCRINE, NUTRITIONAL AND METABOLIC DISEASE: ICD-10-CM

## 2023-09-01 PROCEDURE — 99213 OFFICE O/P EST LOW 20 MIN: CPT

## 2023-09-01 NOTE — ASSESSMENT
[FreeTextEntry1] : Need for tuberculosis testing - Quantiferon today, follow up results - Discussed with patient results are accessible on the patient portal  [facility] pap smear 03/2023, normal results - Advised flu immunization late Sept-Oct in preparation for peak flu season  Anxiety - Continue sertraline  #Hashimoto's thyroiditis, newly diagnosed - Saw Dr. Ferraro (Endocrinology), was told no need for medications at this time - Will continue to monitor TSH levels at future visits

## 2023-09-01 NOTE — PHYSICAL EXAM
[No Acute Distress] : no acute distress [Well Nourished] : well nourished [Well Developed] : well developed [Well-Appearing] : well-appearing [Normal Sclera/Conjunctiva] : normal sclera/conjunctiva [EOMI] : extraocular movements intact [Normal Outer Ear/Nose] : the outer ears and nose were normal in appearance [No Respiratory Distress] : no respiratory distress  [No Accessory Muscle Use] : no accessory muscle use [Clear to Auscultation] : lungs were clear to auscultation bilaterally [Normal Rate] : normal rate  [Regular Rhythm] : with a regular rhythm [Normal S1, S2] : normal S1 and S2 [No Edema] : there was no peripheral edema [No Extremity Clubbing/Cyanosis] : no extremity clubbing/cyanosis [Soft] : abdomen soft [Non Tender] : non-tender [Normal Bowel Sounds] : normal bowel sounds [No Focal Deficits] : no focal deficits [Normal Gait] : normal gait [Normal Affect] : the affect was normal [Normal Insight/Judgement] : insight and judgment were intact

## 2023-09-01 NOTE — HISTORY OF PRESENT ILLNESS
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 Requires quantiferon for work at [employer]. Does not have a form that needs to be completed.    Patient saw [facility], 03/2023, normal results for pap smear. Found to have low DHEAS and low testosterone on hormone panel. Saw endocrinologist Dr. Dimitrios Ferraro, was diagnosed with Hashimoto's thyroiditis, no need for medication treatment at this time as TFTs normal. Strong fhx of hypothyroidism: aunt, grandmother.

## 2023-09-01 NOTE — REVIEW OF SYSTEMS
[Anxiety] : anxiety [Fever] : no fever [Chills] : no chills [Pain] : no pain [Vision Problems] : no vision problems [Itching] : no itching [Earache] : no earache [Hearing Loss] : no hearing loss [Nasal Discharge] : no nasal discharge [Sore Throat] : no sore throat [Chest Pain] : no chest pain [Palpitations] : no palpitations [Lower Ext Edema] : no lower extremity edema [Shortness Of Breath] : no shortness of breath [Wheezing] : no wheezing [Cough] : no cough [Abdominal Pain] : no abdominal pain [Nausea] : no nausea [Constipation] : no constipation [Vomiting] : no vomiting [Heartburn] : no heartburn [Dysuria] : no dysuria [Incontinence] : no incontinence [Hematuria] : no hematuria [Frequency] : no frequency [Joint Pain] : no joint pain [Joint Stiffness] : no joint stiffness [Muscle Pain] : no muscle pain [Back Pain] : no back pain [Skin Rash] : no skin rash [Headache] : no headache [Dizziness] : no dizziness [Suicidal] : not suicidal [Insomnia] : no insomnia [Depression] : no depression [Easy Bleeding] : no easy bleeding [Easy Bruising] : no easy bruising [de-identified] : controlled with medications

## 2023-09-01 NOTE — HEALTH RISK ASSESSMENT
[0] : 2) Feeling down, depressed, or hopeless: Not at all (0) [Several Days (1)] : 4.) Feeling tired or having little energy? Several days [Not at All (0)] : 8.) Moving or speaking so slowly that other people could have noticed, or the opposite, moving or speaking faster than usual? Not at all [Somewhat Difficult] : How difficult have these problems made it for you to do your work, take care of things at home, or get along with people? Somewhat difficult [de-identified] : Endocrinology, GYN [[id number]] : 2

## 2023-09-05 ENCOUNTER — TRANSCRIPTION ENCOUNTER (OUTPATIENT)
Age: 28
End: 2023-09-05

## 2023-09-05 LAB
M TB IFN-G BLD-IMP: NEGATIVE
QUANTIFERON TB PLUS MITOGEN MINUS NIL: 5.23 IU/ML
QUANTIFERON TB PLUS NIL: 0.01 IU/ML
QUANTIFERON TB PLUS TB1 MINUS NIL: 0 IU/ML
QUANTIFERON TB PLUS TB2 MINUS NIL: 0 IU/ML

## 2023-12-27 ENCOUNTER — RX RENEWAL (OUTPATIENT)
Age: 28
End: 2023-12-27

## 2024-03-15 ENCOUNTER — APPOINTMENT (OUTPATIENT)
Dept: INTERNAL MEDICINE | Facility: CLINIC | Age: 29
End: 2024-03-15
Payer: MEDICAID

## 2024-03-15 VITALS
SYSTOLIC BLOOD PRESSURE: 112 MMHG | OXYGEN SATURATION: 99 % | HEIGHT: 63 IN | RESPIRATION RATE: 16 BRPM | HEART RATE: 88 BPM | DIASTOLIC BLOOD PRESSURE: 72 MMHG | TEMPERATURE: 98.3 F | BODY MASS INDEX: 27.64 KG/M2 | WEIGHT: 156 LBS

## 2024-03-15 DIAGNOSIS — Z00.00 ENCOUNTER FOR GENERAL ADULT MEDICAL EXAMINATION W/OUT ABNORMAL FINDINGS: ICD-10-CM

## 2024-03-15 PROCEDURE — 99395 PREV VISIT EST AGE 18-39: CPT

## 2024-03-15 RX ORDER — SERTRALINE HYDROCHLORIDE 100 MG/1
100 TABLET, FILM COATED ORAL
Qty: 30 | Refills: 2 | Status: ACTIVE | COMMUNITY
Start: 2022-01-31 | End: 1900-01-01

## 2024-03-15 NOTE — ASSESSMENT
[FreeTextEntry1] : 27 yo F with PMH of Petit Mal Seizures (until 2009), Hashimoto's, Anxiety, Depression, remote Concussion at 18yo (without LOC) presents for CPE.  #CPE - Labs ordered - UTD with pap smear - Agreeable to STI testing  #Choking during sleep - GERD vs ALTAGRACIA - Trial Pepcid x 2 weeks and counselled on head elevation during sleep - Sleep study (2020) negative for ALTAGRACIA, will repeat sleep study should symptoms persist   #Anxiety, Depression - Renewed Zoloft  #Hashimoto's  - Check TSH

## 2024-03-16 NOTE — PLAN
[FreeTextEntry1] : #CPE - Labs ordered - UTD with pap smear - Agreeable to STI testing  #Choking during sleep - GERD vs ALTAGRACIA - Trial Pepcid x 2 weeks and counselled on head elevation during sleep with wedge pillow  - Sleep study (2020) negative for ALTAGRACIA, will repeat sleep study should symptoms persist   #Anxiety, Depression - Renewed Zoloft  #Hashimoto's  - Check TSH

## 2024-03-16 NOTE — HISTORY OF PRESENT ILLNESS
[FreeTextEntry1] : CPE [de-identified] : 29 yo F with PMH of Petit Mal Seizures (until 2009), Hashimoto's, Anxiety, Depression, remote Concussion at 18yo (without LOC) presents for CPE. Pt admits to frequent choking sensation that awakens her from sleep. She had sleep study in 2020 for excessive daytime sleepiness, although she did not have this symptom at that time. The study was negative for ALTAGRACIA. She had a hx of esophagitis in the past which presented with a cough, she took an antacid which helped. Pt reports she gets 5-6hrs of sleep on weeknights and 10-12hrs on weekends. Pt is requesting refill of Zoloft today.

## 2024-03-16 NOTE — HEALTH RISK ASSESSMENT
[Good] : ~his/her~  mood as  good [2 - 4 times a month (2 pts)] : 2-4 times a month (2 points) [1 or 2 (0 pts)] : 1 or 2 (0 points) [Yes] : In the past 12 months have you used drugs other than those required for medical reasons? Yes [No falls in past year] : Patient reported no falls in the past year [0] : 2) Feeling down, depressed, or hopeless: Not at all (0) [PHQ-2 Negative - No further assessment needed] : PHQ-2 Negative - No further assessment needed [Patient reported PAP Smear was normal] : Patient reported PAP Smear was normal [None] : None [With Family] : lives with family [Student] : student [Significant Other] : lives with significant other [Sexually Active] : sexually active [Feels Safe at Home] : Feels safe at home [Fully functional (bathing, dressing, toileting, transferring, walking, feeding)] : Fully functional (bathing, dressing, toileting, transferring, walking, feeding) [Fully functional (using the telephone, shopping, preparing meals, housekeeping, doing laundry, using] : Fully functional and needs no help or supervision to perform IADLs (using the telephone, shopping, preparing meals, housekeeping, doing laundry, using transportation, managing medications and managing finances) [Reports normal functional visual acuity (ie: able to read med bottle)] : Reports normal functional visual acuity [Patient/Caregiver not ready to engage] : , patient/caregiver not ready to engage [Never] : Never [Audit-CScore] : 2 [de-identified] : Uses Marijuana once a month [de-identified] : Balanced [de-identified] : Active [[id number]] : 0 [Change in mental status noted] : No change in mental status noted [Language] : denies difficulty with language [Behavior] : denies difficulty with behavior [Learning/Retaining New Information] : denies difficulty learning/retaining new information [Handling Complex Tasks] : denies difficulty handling complex tasks [Reasoning] : denies difficulty with reasoning [Spatial Ability and Orientation] : denies difficulty with spatial ability and orientation [High Risk Behavior] : no high risk behavior [Reports changes in hearing] : Reports no changes in hearing [Reports changes in vision] : Reports no changes in vision [Reports changes in dental health] : Reports no changes in dental health [PapSmearDate] : 3/15/2023 [de-identified] : Studying to be  [de-identified] : grandparents

## 2024-03-19 LAB
ALBUMIN SERPL ELPH-MCNC: 4.8 G/DL
ALP BLD-CCNC: 59 U/L
ALT SERPL-CCNC: 7 U/L
ANION GAP SERPL CALC-SCNC: 15 MMOL/L
AST SERPL-CCNC: 13 U/L
BASOPHILS # BLD AUTO: 0.08 K/UL
BASOPHILS NFR BLD AUTO: 1.3 %
BILIRUB SERPL-MCNC: 0.2 MG/DL
BUN SERPL-MCNC: 13 MG/DL
C TRACH RRNA SPEC QL NAA+PROBE: NOT DETECTED
CALCIUM SERPL-MCNC: 9.7 MG/DL
CHLORIDE SERPL-SCNC: 103 MMOL/L
CHOLEST SERPL-MCNC: 235 MG/DL
CO2 SERPL-SCNC: 22 MMOL/L
CREAT SERPL-MCNC: 0.78 MG/DL
EGFR: 107 ML/MIN/1.73M2
EOSINOPHIL # BLD AUTO: 0.09 K/UL
EOSINOPHIL NFR BLD AUTO: 1.4 %
ESTIMATED AVERAGE GLUCOSE: 103 MG/DL
GLUCOSE SERPL-MCNC: 87 MG/DL
HBA1C MFR BLD HPLC: 5.2 %
HCT VFR BLD CALC: 36.9 %
HDLC SERPL-MCNC: 55 MG/DL
HGB BLD-MCNC: 11.9 G/DL
HIV1+2 AB SPEC QL IA.RAPID: NONREACTIVE
IMM GRANULOCYTES NFR BLD AUTO: 0.3 %
LDLC SERPL CALC-MCNC: 164 MG/DL
LYMPHOCYTES # BLD AUTO: 2.27 K/UL
LYMPHOCYTES NFR BLD AUTO: 36.6 %
MAN DIFF?: NORMAL
MCHC RBC-ENTMCNC: 29.2 PG
MCHC RBC-ENTMCNC: 32.2 GM/DL
MCV RBC AUTO: 90.7 FL
MONOCYTES # BLD AUTO: 0.49 K/UL
MONOCYTES NFR BLD AUTO: 7.9 %
N GONORRHOEA RRNA SPEC QL NAA+PROBE: NOT DETECTED
NEUTROPHILS # BLD AUTO: 3.26 K/UL
NEUTROPHILS NFR BLD AUTO: 52.5 %
NONHDLC SERPL-MCNC: 179 MG/DL
PLATELET # BLD AUTO: 285 K/UL
POTASSIUM SERPL-SCNC: 4 MMOL/L
PROT SERPL-MCNC: 7.7 G/DL
RBC # BLD: 4.07 M/UL
RBC # FLD: 14.6 %
SODIUM SERPL-SCNC: 140 MMOL/L
SOURCE AMPLIFICATION: NORMAL
T PALLIDUM AB SER QL IA: NEGATIVE
TRIGL SERPL-MCNC: 89 MG/DL
TSH SERPL-ACNC: 2.33 UIU/ML
WBC # FLD AUTO: 6.21 K/UL

## 2024-05-21 ENCOUNTER — EMERGENCY (EMERGENCY)
Facility: HOSPITAL | Age: 29
LOS: 1 days | Discharge: HOME | End: 2024-05-21
Attending: EMERGENCY MEDICINE
Payer: MEDICAID

## 2024-05-21 VITALS
RESPIRATION RATE: 16 BRPM | SYSTOLIC BLOOD PRESSURE: 100 MMHG | DIASTOLIC BLOOD PRESSURE: 64 MMHG | HEART RATE: 72 BPM | TEMPERATURE: 98 F | OXYGEN SATURATION: 97 %

## 2024-05-21 VITALS
WEIGHT: 154.98 LBS | OXYGEN SATURATION: 99 % | RESPIRATION RATE: 18 BRPM | TEMPERATURE: 98 F | SYSTOLIC BLOOD PRESSURE: 107 MMHG | HEART RATE: 78 BPM | HEIGHT: 63 IN | DIASTOLIC BLOOD PRESSURE: 65 MMHG

## 2024-05-21 LAB
ALBUMIN SERPL ELPH-MCNC: 4.4 G/DL — SIGNIFICANT CHANGE UP (ref 3.3–5)
ALP SERPL-CCNC: 54 U/L — SIGNIFICANT CHANGE UP (ref 40–120)
ALT FLD-CCNC: 8 U/L — LOW (ref 10–45)
ANION GAP SERPL CALC-SCNC: 13 MMOL/L — SIGNIFICANT CHANGE UP (ref 5–17)
APPEARANCE UR: CLEAR — SIGNIFICANT CHANGE UP
AST SERPL-CCNC: 12 U/L — SIGNIFICANT CHANGE UP (ref 10–40)
BASOPHILS # BLD AUTO: 0.04 K/UL — SIGNIFICANT CHANGE UP (ref 0–0.2)
BASOPHILS NFR BLD AUTO: 0.3 % — SIGNIFICANT CHANGE UP (ref 0–2)
BILIRUB SERPL-MCNC: <0.1 MG/DL — LOW (ref 0.2–1.2)
BILIRUB UR-MCNC: NEGATIVE — SIGNIFICANT CHANGE UP
BUN SERPL-MCNC: 12 MG/DL — SIGNIFICANT CHANGE UP (ref 7–23)
CALCIUM SERPL-MCNC: 9.4 MG/DL — SIGNIFICANT CHANGE UP (ref 8.4–10.5)
CHLORIDE SERPL-SCNC: 100 MMOL/L — SIGNIFICANT CHANGE UP (ref 96–108)
CO2 SERPL-SCNC: 24 MMOL/L — SIGNIFICANT CHANGE UP (ref 22–31)
COLOR SPEC: YELLOW — SIGNIFICANT CHANGE UP
CREAT SERPL-MCNC: 0.66 MG/DL — SIGNIFICANT CHANGE UP (ref 0.5–1.3)
DIFF PNL FLD: NEGATIVE — SIGNIFICANT CHANGE UP
EGFR: 122 ML/MIN/1.73M2 — SIGNIFICANT CHANGE UP
EOSINOPHIL # BLD AUTO: 0.03 K/UL — SIGNIFICANT CHANGE UP (ref 0–0.5)
EOSINOPHIL NFR BLD AUTO: 0.2 % — SIGNIFICANT CHANGE UP (ref 0–6)
GLUCOSE SERPL-MCNC: 119 MG/DL — HIGH (ref 70–99)
GLUCOSE UR QL: NEGATIVE MG/DL — SIGNIFICANT CHANGE UP
HCG SERPL-ACNC: <2 MIU/ML — SIGNIFICANT CHANGE UP
HCT VFR BLD CALC: 33.4 % — LOW (ref 34.5–45)
HGB BLD-MCNC: 11 G/DL — LOW (ref 11.5–15.5)
IMM GRANULOCYTES NFR BLD AUTO: 0.5 % — SIGNIFICANT CHANGE UP (ref 0–0.9)
KETONES UR-MCNC: NEGATIVE MG/DL — SIGNIFICANT CHANGE UP
LEUKOCYTE ESTERASE UR-ACNC: NEGATIVE — SIGNIFICANT CHANGE UP
LYMPHOCYTES # BLD AUTO: 1.47 K/UL — SIGNIFICANT CHANGE UP (ref 1–3.3)
LYMPHOCYTES # BLD AUTO: 11.4 % — LOW (ref 13–44)
MCHC RBC-ENTMCNC: 29.6 PG — SIGNIFICANT CHANGE UP (ref 27–34)
MCHC RBC-ENTMCNC: 32.9 GM/DL — SIGNIFICANT CHANGE UP (ref 32–36)
MCV RBC AUTO: 89.8 FL — SIGNIFICANT CHANGE UP (ref 80–100)
MONOCYTES # BLD AUTO: 0.65 K/UL — SIGNIFICANT CHANGE UP (ref 0–0.9)
MONOCYTES NFR BLD AUTO: 5 % — SIGNIFICANT CHANGE UP (ref 2–14)
NEUTROPHILS # BLD AUTO: 10.64 K/UL — HIGH (ref 1.8–7.4)
NEUTROPHILS NFR BLD AUTO: 82.6 % — HIGH (ref 43–77)
NITRITE UR-MCNC: NEGATIVE — SIGNIFICANT CHANGE UP
NRBC # BLD: 0 /100 WBCS — SIGNIFICANT CHANGE UP (ref 0–0)
PH UR: 8.5 (ref 5–8)
PLATELET # BLD AUTO: 272 K/UL — SIGNIFICANT CHANGE UP (ref 150–400)
POTASSIUM SERPL-MCNC: 4 MMOL/L — SIGNIFICANT CHANGE UP (ref 3.5–5.3)
POTASSIUM SERPL-SCNC: 4 MMOL/L — SIGNIFICANT CHANGE UP (ref 3.5–5.3)
PROT SERPL-MCNC: 7.4 G/DL — SIGNIFICANT CHANGE UP (ref 6–8.3)
PROT UR-MCNC: NEGATIVE MG/DL — SIGNIFICANT CHANGE UP
RBC # BLD: 3.72 M/UL — LOW (ref 3.8–5.2)
RBC # FLD: 13.6 % — SIGNIFICANT CHANGE UP (ref 10.3–14.5)
SODIUM SERPL-SCNC: 137 MMOL/L — SIGNIFICANT CHANGE UP (ref 135–145)
SP GR SPEC: 1.02 — SIGNIFICANT CHANGE UP (ref 1–1.03)
UROBILINOGEN FLD QL: 0.2 MG/DL — SIGNIFICANT CHANGE UP (ref 0.2–1)
WBC # BLD: 12.89 K/UL — HIGH (ref 3.8–10.5)
WBC # FLD AUTO: 12.89 K/UL — HIGH (ref 3.8–10.5)

## 2024-05-21 PROCEDURE — 85025 COMPLETE CBC W/AUTO DIFF WBC: CPT

## 2024-05-21 PROCEDURE — 84702 CHORIONIC GONADOTROPIN TEST: CPT

## 2024-05-21 PROCEDURE — 72100 X-RAY EXAM L-S SPINE 2/3 VWS: CPT | Mod: 26

## 2024-05-21 PROCEDURE — 81003 URINALYSIS AUTO W/O SCOPE: CPT

## 2024-05-21 PROCEDURE — 80053 COMPREHEN METABOLIC PANEL: CPT

## 2024-05-21 PROCEDURE — 99284 EMERGENCY DEPT VISIT MOD MDM: CPT | Mod: 25

## 2024-05-21 PROCEDURE — 72100 X-RAY EXAM L-S SPINE 2/3 VWS: CPT

## 2024-05-21 RX ORDER — METHOCARBAMOL 500 MG/1
2 TABLET, FILM COATED ORAL
Qty: 42 | Refills: 0
Start: 2024-05-21 | End: 2024-05-27

## 2024-05-21 RX ORDER — METHOCARBAMOL 500 MG/1
500 TABLET, FILM COATED ORAL ONCE
Refills: 0 | Status: COMPLETED | OUTPATIENT
Start: 2024-05-21 | End: 2024-05-21

## 2024-05-21 RX ORDER — ACETAMINOPHEN 500 MG
650 TABLET ORAL ONCE
Refills: 0 | Status: COMPLETED | OUTPATIENT
Start: 2024-05-21 | End: 2024-05-21

## 2024-05-21 RX ADMIN — METHOCARBAMOL 500 MILLIGRAM(S): 500 TABLET, FILM COATED ORAL at 05:17

## 2024-05-21 RX ADMIN — Medication 650 MILLIGRAM(S): at 05:17

## 2024-05-21 NOTE — ED ADULT NURSE NOTE - NSICDXPASTMEDICALHX_GEN_ALL_CORE_FT
PAST MEDICAL HISTORY:  Anxiety     Complex tear of medial meniscus, current injury, left knee, initial encounter     Depression     Seizure (H/x of petit mal seizure dx at age 8, last seizure at age 13, no meds since age 13, follows up with neurologist annually, last seeen in 2018)

## 2024-05-21 NOTE — ED PROVIDER NOTE - PHYSICAL EXAMINATION
PHYSICAL EXAM:  GENERAL: NAD, lying in bed comfortably  HEAD:  Atraumatic, Normocephalic  EYES: EOMI, conjunctiva and sclera clear  ENT: Moist mucous membranes  NECK: Supple  CHEST/LUNG: Clear to auscultation bilaterally; No rales, rhonchi, wheezing, or rubs. Unlabored respirations  HEART: Regular rate and rhythm; No murmurs, rubs, or gallops  ABDOMEN: Bowel sounds present; Soft, Nontender, Nondistended. No hepatomegally  EXTREMITIES:  2+ Peripheral Pulses, brisk capillary refill. No clubbing, cyanosis, or edema  NERVOUS SYSTEM:  Alert & Oriented X3, speech clear. No deficits   MSK: FROM all 4 extremities, full and equal strength; + paraspinal TTP and TTP lumbar spine  SKIN: No rashes or lesions

## 2024-05-21 NOTE — ED PROVIDER NOTE - NSFOLLOWUPCLINICS_GEN_ALL_ED_FT
[facility] Orthopedic Surgery  Orthopedic Surgery  [address], 3rd & 4th [place] [address]  Phone: [phone]  Fax:

## 2024-05-21 NOTE — ED ADULT NURSE NOTE - NSFALLUNIVINTERV_ED_ALL_ED
Bed/Stretcher in lowest position, wheels locked, appropriate side rails in place/Call bell, personal items and telephone in reach/Instruct patient to call for assistance before getting out of bed/chair/stretcher/Non-slip footwear applied when patient is off stretcher/Odd to call system/Physically safe environment - no spills, clutter or unnecessary equipment/Purposeful proactive rounding/Room/bathroom lighting operational, light cord in reach

## 2024-05-21 NOTE — ED PROVIDER NOTE - CLINICAL SUMMARY MEDICAL DECISION MAKING FREE TEXT BOX
29F no pmhx p/w back pain x10h radiating to abdomen. No focal neurologic, urologic sxs. Prominent paraspinal tenderness on exam. Will obtain XR L spine. Trial tylenol and robaxin and reassess. 29F no pmhx p/w back pain x10h radiating to abdomen. No focal neurologic, urologic sxs. Prominent paraspinal tenderness on exam. Will obtain XR L spine. Trial tylenol and robaxin and reassess.    WEST Anderson MD: Agree with resident/ACP MDM, assessment and plan as above.

## 2024-05-21 NOTE — ED PROVIDER NOTE - NSFOLLOWUPINSTRUCTIONS_ED_ALL_ED_FT
You were evaluated for back pain. Labs and Xray of your abdomen were unrevealing. Your symptoms are likely due to a muscle sprain. We are sending you a prescription for the muscle relaxer robaxin which you can take as needed for the pain. You can also use tylenol for the pain.   You will be called in the next 24-48 hours for an appointment with the spine clinic for further management and follow up. You were evaluated for back pain. Labs and Xray of your spine were unrevealing. Your symptoms are likely due to a muscle sprain. We are sending you a prescription for the muscle relaxer robaxin which you can take 3 times a day as needed for the pain. You can also use tylenol for the pain.   You will be called in the next 24-48 hours for an appointment with the spine clinic for further management and follow up.

## 2024-05-21 NOTE — ED PROVIDER NOTE - PATIENT PORTAL LINK FT
You can access the FollowMyHealth Patient Portal offered by [facility] by registering at the following website: [email or web address]. By joining [facility]’s FollowMyHealth portal, you will also be able to view your health information using other applications (apps) compatible with our system.

## 2024-05-21 NOTE — ED PROVIDER NOTE - OBJECTIVE STATEMENT
29F no PMHx p/w worsening back pain that started around 5-6pm today. Describes pain as sharp and constant. Radiates under the ribs around to abdomen and associated with nausea. No numbness, tingling, weakness, f/c, urinary sxs, diarrhea, vomiting. No trauma or heavy lifting. Took advil, hot/cold pack, lidocaine spray without relief.

## 2024-05-21 NOTE — ED ADULT TRIAGE NOTE - CHIEF COMPLAINT QUOTE
back pain radiating to abd; general back pain x 2 months but much worse today despite ibuprofen, hot and cold packs, lido patches; no traumas

## 2024-05-22 ENCOUNTER — APPOINTMENT (OUTPATIENT)
Dept: ORTHOPEDIC SURGERY | Facility: CLINIC | Age: 29
End: 2024-05-22
Payer: MEDICAID

## 2024-05-22 VITALS
SYSTOLIC BLOOD PRESSURE: 102 MMHG | HEIGHT: 63 IN | BODY MASS INDEX: 27.46 KG/M2 | HEART RATE: 82 BPM | WEIGHT: 155 LBS | DIASTOLIC BLOOD PRESSURE: 66 MMHG

## 2024-05-22 DIAGNOSIS — S29.019A STRAIN OF MUSCLE AND TENDON OF UNSPECIFIED WALL OF THORAX, INITIAL ENCOUNTER: ICD-10-CM

## 2024-05-22 PROCEDURE — 99203 OFFICE O/P NEW LOW 30 MIN: CPT

## 2024-05-22 RX ORDER — CELECOXIB 200 MG/1
200 CAPSULE ORAL
Qty: 60 | Refills: 0 | Status: ACTIVE | COMMUNITY
Start: 2024-05-22 | End: 1900-01-01

## 2024-05-22 NOTE — DISCUSSION/SUMMARY
[de-identified] : She will continue to take her muscle relaxer Robaxin as needed for the next week Celebrex 200 mg twice daily for 1 week and then as needed thereafter She will continue her exercise routine and I recommended that she will start light before returning back to any heavy lifting or stress to prevent exacerbations I will see her back in 1 month if her symptoms fail to improve or dissipate

## 2024-05-22 NOTE — HISTORY OF PRESENT ILLNESS
[de-identified] : Chief Complaint: Thoracic back pain   History of Present Illness: 29-year-old female presenting today for initial evaluation for thoracic back pain.  She states that on Monday about 2 days ago she came home from work was having mid thoracic back pain radiating into her anterior abdomen aching in nature she states it was severe she took some anti-inflammatories and she states the pain worsened.  She presented to [facility] emergency room on Monday evening she subsequently spent the night she was given some muscle relaxers and she states that her pain is improved she rates the pain is about a 3 or 4 out of 10 with the muscle relaxers aching in nature it is no longer radiating to the anterior abdomen.  Worse with activity such as bending twisting lifting but it feels like a constant aching pain.  She denies any lower extremity radicular symptoms she denies any numbness or tingling.   Past medical history, past surgical history, medications, allergies, social history, and family history are as documented in our records today.  Notable items include: None   Review of Systems: I have reviewed the patient's documented Review of Systems data today, I concur with this documentation.

## 2024-05-22 NOTE — ASSESSMENT
[FreeTextEntry1] : 29-year-old female with thoracic myofascial strain  I discussed with Elza that I believe her symptoms are likely coming from her thoracic paraspinal musculature is likely from a strain, and I believe this will continue to improve with conservative management.  The patient and I discussed the use of non-steroidal anti-inflammatory drugs (NSAIDs) today.  The patient understands that there are both over the counter (OTC) and prescribed medications in this drug class that may be used in the treatment of spinal conditions.  The benefits of this class of medications may include: diminished pain, increased function, and a diminished use of other classes of medications.  Use of medications in this class may also have risks.  These risks include, but are not limited to: kidney damage, gastro-intestinal/stomach issues, cardiovascular issues, reactions to the medication (allergy/intolerance), bleeding concerns, interactions with other medication, and other issues.  Today, I have prescribed Celebrex 200 mg twice daily. The patient was counseled to contact us if concerns arise as the result of this medication.  The patient was also counseled to stop the medication if these concerns arise.

## 2024-05-22 NOTE — PHYSICAL EXAM
[de-identified] : CONSTITUTIONAL: Patient is a very pleasant individual who is well-nourished and appears stated age. PSYCHIATRIC: Alert and oriented times three and in no apparent distress, and participates with orthopedic evaluation well. HEAD: Atraumatic and nonsyndromic in appearance. EENT: No thyromegaly, EOMI. RESPIRATORY: Respiratory rate is regular, not dyspneic on examination. LYMPHATICS: There is no cervical or axillary lymphadenopathy. INTEGUMENTARY: Skin is clean, dry, and intact to bilateral lower extremities. VASCULAR: There is brisk capillary refill about the bilateral Lower Extremities with 2+ DP Pulse  Palpation: Mild tenderness and paraspinal spinal muscle hypertonicity within the mid to lower thoracic spine No pain with internal or external range of motion bilateral hips  Muscle Strength Testing: Hip Flexion: 5/5 B/L Knee Extension: 5/5 B/L Knee Flexion: 5/5 B/L Dorsiflexion: 5/5 B/L EHL: 5/5 B/L Plantarflexion: 5/5 B/L  Sensation: SILT L2-S1 B/L except: None  Reflexes: 2+ Quadriceps/Achilles  Gait: Normal gait w/o assistance Able to perform tandem gait Able to Heel Walk Able to Toe Walk  Special Testing:  Negative SLR BLLE Negative clonus BLLE  [de-identified] : AP lateral lumbar spine performed in [facility] PACS demonstrates no significant deformity on AP film lateral radiographs demonstrates well-maintained lumbar lordosis well-maintained disc height no significant spondylosis

## 2024-07-04 ENCOUNTER — RX RENEWAL (OUTPATIENT)
Age: 29
End: 2024-07-04

## 2024-10-07 ENCOUNTER — RX RENEWAL (OUTPATIENT)
Age: 29
End: 2024-10-07

## 2024-10-16 ENCOUNTER — APPOINTMENT (OUTPATIENT)
Dept: INTERNAL MEDICINE | Facility: CLINIC | Age: 29
End: 2024-10-16

## 2024-12-09 NOTE — ED PROVIDER NOTE - NSICDXPASTSURGICALHX_GEN_ALL_CORE_FT
[de-identified] :  RIGHT SHOULDER  Inspection: No swelling.  Palpation: Tenderness is noted at the anterior shoulder.  Range of motion: Full range of motion but with some pain. Strength: There is pain and discomfort with strength testing.  Neurological testing: motor and sensor intact distally. Ligament Stability and Special Tests:  Shoulder apprehension: pos Shoulder relocation: pos Obriens test: pos Biceps Active test: pos Schmitt Labral Shear: pos Impingement testing: neg Yael testing: pain Whipple: neg Cross Body Adduction: neg 
PAST SURGICAL HISTORY:  No significant past surgical history

## 2025-01-10 ENCOUNTER — RX RENEWAL (OUTPATIENT)
Age: 30
End: 2025-01-10

## 2025-03-19 ENCOUNTER — APPOINTMENT (OUTPATIENT)
Dept: INTERNAL MEDICINE | Facility: CLINIC | Age: 30
End: 2025-03-19
Payer: MEDICAID

## 2025-03-19 VITALS
SYSTOLIC BLOOD PRESSURE: 126 MMHG | HEIGHT: 63 IN | RESPIRATION RATE: 14 BRPM | HEART RATE: 88 BPM | WEIGHT: 170 LBS | BODY MASS INDEX: 30.12 KG/M2 | OXYGEN SATURATION: 98 % | DIASTOLIC BLOOD PRESSURE: 66 MMHG

## 2025-03-19 DIAGNOSIS — R53.83 OTHER FATIGUE: ICD-10-CM

## 2025-03-19 DIAGNOSIS — F32.A ANXIETY DISORDER, UNSPECIFIED: ICD-10-CM

## 2025-03-19 DIAGNOSIS — Z00.00 ENCOUNTER FOR GENERAL ADULT MEDICAL EXAMINATION W/OUT ABNORMAL FINDINGS: ICD-10-CM

## 2025-03-19 DIAGNOSIS — F41.9 ANXIETY DISORDER, UNSPECIFIED: ICD-10-CM

## 2025-03-19 LAB
ALBUMIN SERPL ELPH-MCNC: 4.7 G/DL
ALP BLD-CCNC: 68 U/L
ALT SERPL-CCNC: 23 U/L
ANION GAP SERPL CALC-SCNC: 12 MMOL/L
AST SERPL-CCNC: 25 U/L
BASOPHILS # BLD AUTO: 0.04 K/UL
BASOPHILS NFR BLD AUTO: 0.7 %
BILIRUB SERPL-MCNC: 0.3 MG/DL
BUN SERPL-MCNC: 9 MG/DL
CALCIUM SERPL-MCNC: 10 MG/DL
CHLORIDE SERPL-SCNC: 103 MMOL/L
CHOLEST SERPL-MCNC: 221 MG/DL
CO2 SERPL-SCNC: 26 MMOL/L
CREAT SERPL-MCNC: 0.76 MG/DL
EGFRCR SERPLBLD CKD-EPI 2021: 109 ML/MIN/1.73M2
EOSINOPHIL # BLD AUTO: 0.06 K/UL
EOSINOPHIL NFR BLD AUTO: 1 %
ESTIMATED AVERAGE GLUCOSE: 103 MG/DL
GLUCOSE SERPL-MCNC: 91 MG/DL
HBA1C MFR BLD HPLC: 5.2 %
HCT VFR BLD CALC: 37.5 %
HDLC SERPL-MCNC: 52 MG/DL
HGB BLD-MCNC: 12.3 G/DL
IMM GRANULOCYTES NFR BLD AUTO: 0.2 %
LDLC SERPL-MCNC: 146 MG/DL
LYMPHOCYTES # BLD AUTO: 1.99 K/UL
LYMPHOCYTES NFR BLD AUTO: 34.4 %
MAN DIFF?: NORMAL
MCHC RBC-ENTMCNC: 29.3 PG
MCHC RBC-ENTMCNC: 32.8 G/DL
MCV RBC AUTO: 89.3 FL
MONOCYTES # BLD AUTO: 0.4 K/UL
MONOCYTES NFR BLD AUTO: 6.9 %
NEUTROPHILS # BLD AUTO: 3.29 K/UL
NEUTROPHILS NFR BLD AUTO: 56.8 %
NONHDLC SERPL-MCNC: 169 MG/DL
PLATELET # BLD AUTO: 311 K/UL
POTASSIUM SERPL-SCNC: 4.1 MMOL/L
PROLACTIN SERPL-MCNC: 12.6 NG/ML
PROT SERPL-MCNC: 7.7 G/DL
RBC # BLD: 4.2 M/UL
RBC # FLD: 14.6 %
SODIUM SERPL-SCNC: 141 MMOL/L
TRIGL SERPL-MCNC: 132 MG/DL
TSH SERPL-ACNC: 1.18 UIU/ML
WBC # FLD AUTO: 5.79 K/UL

## 2025-03-19 PROCEDURE — 99213 OFFICE O/P EST LOW 20 MIN: CPT | Mod: 25

## 2025-03-19 PROCEDURE — 99395 PREV VISIT EST AGE 18-39: CPT

## 2025-03-28 ENCOUNTER — NON-APPOINTMENT (OUTPATIENT)
Age: 30
End: 2025-03-28

## 2025-06-04 ENCOUNTER — RX RENEWAL (OUTPATIENT)
Age: 30
End: 2025-06-04

## 2025-06-11 ENCOUNTER — APPOINTMENT (OUTPATIENT)
Dept: INTERNAL MEDICINE | Facility: CLINIC | Age: 30
End: 2025-06-11

## 2025-06-23 ENCOUNTER — NON-APPOINTMENT (OUTPATIENT)
Age: 30
End: 2025-06-23

## 2025-06-25 ENCOUNTER — APPOINTMENT (OUTPATIENT)
Dept: INTERNAL MEDICINE | Facility: CLINIC | Age: 30
End: 2025-06-25

## 2025-07-02 ENCOUNTER — APPOINTMENT (OUTPATIENT)
Dept: INTERNAL MEDICINE | Facility: CLINIC | Age: 30
End: 2025-07-02
Payer: MEDICAID

## 2025-07-02 VITALS
TEMPERATURE: 98.8 F | HEART RATE: 98 BPM | WEIGHT: 165 LBS | DIASTOLIC BLOOD PRESSURE: 60 MMHG | SYSTOLIC BLOOD PRESSURE: 92 MMHG | RESPIRATION RATE: 14 BRPM | BODY MASS INDEX: 29.23 KG/M2 | HEIGHT: 63 IN | OXYGEN SATURATION: 97 %

## 2025-07-02 PROCEDURE — 99213 OFFICE O/P EST LOW 20 MIN: CPT
